# Patient Record
Sex: MALE | Race: WHITE | URBAN - METROPOLITAN AREA
[De-identification: names, ages, dates, MRNs, and addresses within clinical notes are randomized per-mention and may not be internally consistent; named-entity substitution may affect disease eponyms.]

---

## 2017-08-23 ENCOUNTER — OFFICE VISIT (OUTPATIENT)
Dept: FAMILY MEDICINE CLINIC | Age: 47
End: 2017-08-23

## 2017-08-23 VITALS
WEIGHT: 169.2 LBS | BODY MASS INDEX: 24.99 KG/M2 | OXYGEN SATURATION: 100 % | TEMPERATURE: 96.6 F | DIASTOLIC BLOOD PRESSURE: 96 MMHG | SYSTOLIC BLOOD PRESSURE: 162 MMHG | HEART RATE: 67 BPM

## 2017-08-23 DIAGNOSIS — R03.0 ELEVATED BLOOD PRESSURE READING: ICD-10-CM

## 2017-08-23 DIAGNOSIS — F41.9 ANXIETY: ICD-10-CM

## 2017-08-23 DIAGNOSIS — R07.89 CHEST WALL PAIN: Primary | ICD-10-CM

## 2017-08-23 PROCEDURE — 99214 OFFICE O/P EST MOD 30 MIN: CPT | Performed by: FAMILY MEDICINE

## 2017-08-23 RX ORDER — LORAZEPAM 0.5 MG/1
0.5 TABLET ORAL EVERY 8 HOURS PRN
Qty: 90 TABLET | Refills: 2 | Status: SHIPPED | OUTPATIENT
Start: 2017-08-23 | End: 2017-11-20 | Stop reason: SDUPTHER

## 2017-09-14 ENCOUNTER — OFFICE VISIT (OUTPATIENT)
Dept: FAMILY MEDICINE CLINIC | Age: 47
End: 2017-09-14

## 2017-09-14 VITALS
HEART RATE: 96 BPM | DIASTOLIC BLOOD PRESSURE: 92 MMHG | WEIGHT: 173.6 LBS | TEMPERATURE: 97.5 F | SYSTOLIC BLOOD PRESSURE: 155 MMHG | BODY MASS INDEX: 25.64 KG/M2 | RESPIRATION RATE: 16 BRPM

## 2017-09-14 DIAGNOSIS — N40.1 BENIGN NON-NODULAR PROSTATIC HYPERPLASIA WITH LOWER URINARY TRACT SYMPTOMS: ICD-10-CM

## 2017-09-14 DIAGNOSIS — Z00.00 ROUTINE GENERAL MEDICAL EXAMINATION AT A HEALTH CARE FACILITY: ICD-10-CM

## 2017-09-14 DIAGNOSIS — E78.00 HYPERCHOLESTEROLEMIA: ICD-10-CM

## 2017-09-14 DIAGNOSIS — F41.9 ANXIETY: Primary | ICD-10-CM

## 2017-09-14 DIAGNOSIS — R03.0 ELEVATED BLOOD PRESSURE READING: ICD-10-CM

## 2017-09-14 PROCEDURE — 99214 OFFICE O/P EST MOD 30 MIN: CPT | Performed by: FAMILY MEDICINE

## 2017-11-16 DIAGNOSIS — Z00.00 ROUTINE GENERAL MEDICAL EXAMINATION AT A HEALTH CARE FACILITY: ICD-10-CM

## 2017-11-16 DIAGNOSIS — N40.1 BENIGN NON-NODULAR PROSTATIC HYPERPLASIA WITH LOWER URINARY TRACT SYMPTOMS: ICD-10-CM

## 2017-11-16 LAB
A/G RATIO: 2 (ref 1.1–2.2)
ALBUMIN SERPL-MCNC: 4.7 G/DL (ref 3.4–5)
ALP BLD-CCNC: 54 U/L (ref 40–129)
ALT SERPL-CCNC: 25 U/L (ref 10–40)
ANION GAP SERPL CALCULATED.3IONS-SCNC: 15 MMOL/L (ref 3–16)
AST SERPL-CCNC: 24 U/L (ref 15–37)
BILIRUB SERPL-MCNC: 0.4 MG/DL (ref 0–1)
BUN BLDV-MCNC: 21 MG/DL (ref 7–20)
CALCIUM SERPL-MCNC: 9.5 MG/DL (ref 8.3–10.6)
CHLORIDE BLD-SCNC: 98 MMOL/L (ref 99–110)
CHOLESTEROL, TOTAL: 239 MG/DL (ref 0–199)
CO2: 25 MMOL/L (ref 21–32)
CREAT SERPL-MCNC: 0.8 MG/DL (ref 0.9–1.3)
ESTIMATED AVERAGE GLUCOSE: 105.4 MG/DL
GFR AFRICAN AMERICAN: >60
GFR NON-AFRICAN AMERICAN: >60
GLOBULIN: 2.4 G/DL
GLUCOSE BLD-MCNC: 95 MG/DL (ref 70–99)
HBA1C MFR BLD: 5.3 %
HCT VFR BLD CALC: 47.1 % (ref 40.5–52.5)
HDLC SERPL-MCNC: 49 MG/DL (ref 40–60)
HEMOGLOBIN: 15.9 G/DL (ref 13.5–17.5)
LDL CHOLESTEROL CALCULATED: 154 MG/DL
MCH RBC QN AUTO: 31.8 PG (ref 26–34)
MCHC RBC AUTO-ENTMCNC: 33.7 G/DL (ref 31–36)
MCV RBC AUTO: 94.4 FL (ref 80–100)
PDW BLD-RTO: 12.7 % (ref 12.4–15.4)
PLATELET # BLD: 208 K/UL (ref 135–450)
PMV BLD AUTO: 8 FL (ref 5–10.5)
POTASSIUM SERPL-SCNC: 4.6 MMOL/L (ref 3.5–5.1)
PROSTATE SPECIFIC ANTIGEN: 1.78 NG/ML (ref 0–4)
RBC # BLD: 4.99 M/UL (ref 4.2–5.9)
SODIUM BLD-SCNC: 138 MMOL/L (ref 136–145)
T4 FREE: 1.2 NG/DL (ref 0.9–1.8)
TOTAL PROTEIN: 7.1 G/DL (ref 6.4–8.2)
TRIGL SERPL-MCNC: 178 MG/DL (ref 0–150)
TSH SERPL DL<=0.05 MIU/L-ACNC: 1.59 UIU/ML (ref 0.27–4.2)
VLDLC SERPL CALC-MCNC: 36 MG/DL
WBC # BLD: 4.1 K/UL (ref 4–11)

## 2017-11-20 ENCOUNTER — OFFICE VISIT (OUTPATIENT)
Dept: FAMILY MEDICINE CLINIC | Age: 47
End: 2017-11-20

## 2017-11-20 VITALS
HEIGHT: 69 IN | RESPIRATION RATE: 14 BRPM | TEMPERATURE: 97.1 F | HEART RATE: 100 BPM | SYSTOLIC BLOOD PRESSURE: 148 MMHG | WEIGHT: 176.8 LBS | DIASTOLIC BLOOD PRESSURE: 94 MMHG | BODY MASS INDEX: 26.19 KG/M2 | OXYGEN SATURATION: 99 %

## 2017-11-20 DIAGNOSIS — E78.00 HYPERCHOLESTEROLEMIA: ICD-10-CM

## 2017-11-20 DIAGNOSIS — F41.9 ANXIETY: ICD-10-CM

## 2017-11-20 DIAGNOSIS — Z00.00 ROUTINE GENERAL MEDICAL EXAMINATION AT A HEALTH CARE FACILITY: Primary | ICD-10-CM

## 2017-11-20 DIAGNOSIS — I10 ESSENTIAL HYPERTENSION: ICD-10-CM

## 2017-11-20 PROCEDURE — 99396 PREV VISIT EST AGE 40-64: CPT | Performed by: FAMILY MEDICINE

## 2017-11-20 RX ORDER — LISINOPRIL 10 MG/1
10 TABLET ORAL DAILY
Qty: 30 TABLET | Refills: 5 | Status: SHIPPED | OUTPATIENT
Start: 2017-11-20 | End: 2018-05-19 | Stop reason: SDUPTHER

## 2017-11-20 RX ORDER — LORAZEPAM 0.5 MG/1
0.5 TABLET ORAL EVERY 8 HOURS PRN
Qty: 90 TABLET | Refills: 2 | Status: SHIPPED | OUTPATIENT
Start: 2017-11-20 | End: 2018-01-14 | Stop reason: SDUPTHER

## 2017-11-20 ASSESSMENT — PATIENT HEALTH QUESTIONNAIRE - PHQ9
2. FEELING DOWN, DEPRESSED OR HOPELESS: 0
SUM OF ALL RESPONSES TO PHQ QUESTIONS 1-9: 0
1. LITTLE INTEREST OR PLEASURE IN DOING THINGS: 0
SUM OF ALL RESPONSES TO PHQ9 QUESTIONS 1 & 2: 0

## 2017-11-20 NOTE — PROGRESS NOTES
[facility]. The patient has signed appropriate paperworks/consents. Dragon dictation software was used for parts of this progress note. All attempts were made to correct any errors and ensure accuracy.

## 2018-01-04 ENCOUNTER — OFFICE VISIT (OUTPATIENT)
Dept: FAMILY MEDICINE CLINIC | Age: 48
End: 2018-01-04

## 2018-01-04 VITALS
TEMPERATURE: 99.1 F | DIASTOLIC BLOOD PRESSURE: 82 MMHG | WEIGHT: 185.6 LBS | BODY MASS INDEX: 27.41 KG/M2 | HEART RATE: 76 BPM | RESPIRATION RATE: 18 BRPM | SYSTOLIC BLOOD PRESSURE: 124 MMHG | OXYGEN SATURATION: 98 %

## 2018-01-04 DIAGNOSIS — F41.9 ANXIETY: ICD-10-CM

## 2018-01-04 DIAGNOSIS — E78.00 HYPERCHOLESTEROLEMIA: ICD-10-CM

## 2018-01-04 DIAGNOSIS — I10 ESSENTIAL HYPERTENSION: Primary | ICD-10-CM

## 2018-01-04 PROCEDURE — 99214 OFFICE O/P EST MOD 30 MIN: CPT | Performed by: FAMILY MEDICINE

## 2018-01-04 NOTE — PROGRESS NOTES
Here for f/u and recheck of blood pressure. Pt was started on lisinopril for some elevation of blood pressure. Pt has tolerated the medication very well and has seen improvement in blood pressure consistently. Pt checking regularly, and seeing blood pressure readings of 120/70-80's. Pt had dramatic decrease very quickly and has maintained. Pt tolerating w/o any concerning side effects. This /82. Pt does see some intermittent spikes but overall has had some moderate improvement and does feel better. No issues of chest pain, shortness of breath. No dry cough. Pt has had some mild URI sx and some PND, but sx are mild and not taking anything for this. Mood doing great, and feels that mood has actually has been better. Except as noted above in the history of present illness, the review of systems is  negative for headache, vision changes, chest pain, shortness of breath, abdominal pain, urinary sx, bowel changes. Past medical, surgical, and social history reviewed and updated  Medications and allergies reviewed and updated         O: /82   Pulse 76   Temp 99.1 °F (37.3 °C) (Oral)   Resp 18   Wt 185 lb 9.6 oz (84.2 kg)   SpO2 98%   BMI 27.41 kg/m²   GEN: No acute distress, cooperative, well nourished, alert. HEENT: PEERLA, EOMI , normocephalic/atraumatic, nares and oropharynx clear. Mucus membranes normal, Tympanic membranes clear bilaterally. Neck: soft, supple, no thyromegaly, mass, no Lymphadenopathy  CV: Regular rate and rhythm, no murmur, rubs, gallops. No edema. Resp: Clear to auscultation bilaterally good air entry bilaterally  No crackles, wheeze. Breathing comfortably. Ext: no clubbing, cyanosis, edema        ASSESSMENT / PLAN:    1. Essential hypertension  blood pressure doing great with start of ACE therapy  Continue present management. , tolerating well.       2. Hypercholesterolemia  Ok to monitor with diet/exercise  Will continue to monitor and recheck in 6

## 2018-01-14 DIAGNOSIS — F41.9 ANXIETY: ICD-10-CM

## 2018-01-16 RX ORDER — LORAZEPAM 0.5 MG/1
0.5 TABLET ORAL EVERY 8 HOURS PRN
Qty: 90 TABLET | Refills: 0 | Status: SHIPPED | OUTPATIENT
Start: 2018-01-16 | End: 2018-02-15

## 2018-07-12 ENCOUNTER — OFFICE VISIT (OUTPATIENT)
Dept: FAMILY MEDICINE CLINIC | Age: 48
End: 2018-07-12

## 2018-07-12 VITALS
RESPIRATION RATE: 16 BRPM | HEART RATE: 63 BPM | WEIGHT: 167 LBS | DIASTOLIC BLOOD PRESSURE: 84 MMHG | BODY MASS INDEX: 24.66 KG/M2 | OXYGEN SATURATION: 100 % | SYSTOLIC BLOOD PRESSURE: 130 MMHG

## 2018-07-12 DIAGNOSIS — I10 ESSENTIAL HYPERTENSION: Primary | ICD-10-CM

## 2018-07-12 DIAGNOSIS — R63.4 WEIGHT LOSS: ICD-10-CM

## 2018-07-12 DIAGNOSIS — F41.9 ANXIETY: ICD-10-CM

## 2018-07-12 PROCEDURE — 99214 OFFICE O/P EST MOD 30 MIN: CPT | Performed by: FAMILY MEDICINE

## 2018-07-12 RX ORDER — LORAZEPAM 0.5 MG/1
0.5 TABLET ORAL EVERY 8 HOURS PRN
Qty: 90 TABLET | Refills: 2 | Status: SHIPPED | OUTPATIENT
Start: 2018-07-12 | End: 2019-07-22 | Stop reason: SDUPTHER

## 2018-07-12 NOTE — PROGRESS NOTES
every 8 hours as needed for Anxiety for up to 30 days. . 90 tablet 2    lisinopril (PRINIVIL;ZESTRIL) 10 MG tablet TAKE 1 TABLET BY MOUTH DAILY 30 tablet 5    NAPROXEN PO Take  by mouth.  EPINEPHrine (EPIPEN 2-TRAE) 0.3 MG/0.3ML ARIANA injection Inject 0.3 mLs into the muscle once as needed for 1 dose. Use as directed for allergic reaction 2 Device 3     No current facility-administered medications for this visit. Wt Readings from Last 3 Encounters:   07/12/18 167 lb (75.8 kg)   01/04/18 185 lb 9.6 oz (84.2 kg)   11/20/17 176 lb 12.8 oz (80.2 kg)        ASSESSMENT / PLAN:    1. Essential hypertension  Stable @ goal  Cont to monitor with diet/exercise and sodium restriction  Recheck bloodwork at f/u appt/CPE    2. Weight loss  Reviewed weight as above  Pt tends to fluctuate between weights, no sx, exam nofocal, no B sx  Monitor and if continues, will need to consider further eval with bloodwork, imaging, colonoscopy     3. Anxiety  Stable w/o flare  rf given as below, use intermittent  See CSM  - LORazepam (ATIVAN) 0.5 MG tablet; Take 1 tablet by mouth every 8 hours as needed for Anxiety for up to 30 days. .  Dispense: 90 tablet; Refill: 2           Follow-up appointment: For CPE 4-5months    Discussed use, benefit, and side effects of all prescribed medications. Barriers to medication compliance addressed. All patient questions answered. Pt voiced understanding. When applicable, [facility]'s [facility]. The patient has signed appropriate paperworks/consents. Dragon dictation software was used for parts of this progress note. All attempts were made to correct any errors and ensure accuracy.

## 2018-11-29 ENCOUNTER — OFFICE VISIT (OUTPATIENT)
Dept: FAMILY MEDICINE CLINIC | Age: 48
End: 2018-11-29
Payer: COMMERCIAL

## 2018-11-29 VITALS
TEMPERATURE: 98.2 F | BODY MASS INDEX: 24.47 KG/M2 | SYSTOLIC BLOOD PRESSURE: 134 MMHG | DIASTOLIC BLOOD PRESSURE: 96 MMHG | HEART RATE: 82 BPM | RESPIRATION RATE: 12 BRPM | OXYGEN SATURATION: 100 % | WEIGHT: 165.2 LBS | HEIGHT: 69 IN

## 2018-11-29 DIAGNOSIS — F41.9 ANXIETY: ICD-10-CM

## 2018-11-29 DIAGNOSIS — R07.89 RIGHT-SIDED CHEST WALL PAIN: ICD-10-CM

## 2018-11-29 DIAGNOSIS — I10 ESSENTIAL HYPERTENSION: ICD-10-CM

## 2018-11-29 DIAGNOSIS — E78.00 HYPERCHOLESTEROLEMIA: ICD-10-CM

## 2018-11-29 DIAGNOSIS — N40.1 BENIGN PROSTATIC HYPERPLASIA WITH URINARY FREQUENCY: ICD-10-CM

## 2018-11-29 DIAGNOSIS — R35.0 BENIGN PROSTATIC HYPERPLASIA WITH URINARY FREQUENCY: ICD-10-CM

## 2018-11-29 DIAGNOSIS — R53.81 MALAISE: ICD-10-CM

## 2018-11-29 DIAGNOSIS — Z00.00 ROUTINE GENERAL MEDICAL EXAMINATION AT A HEALTH CARE FACILITY: Primary | ICD-10-CM

## 2018-11-29 PROCEDURE — 99396 PREV VISIT EST AGE 40-64: CPT | Performed by: FAMILY MEDICINE

## 2018-11-29 ASSESSMENT — PATIENT HEALTH QUESTIONNAIRE - PHQ9
SUM OF ALL RESPONSES TO PHQ9 QUESTIONS 1 & 2: 2
SUM OF ALL RESPONSES TO PHQ QUESTIONS 1-9: 2
SUM OF ALL RESPONSES TO PHQ QUESTIONS 1-9: 2
1. LITTLE INTEREST OR PLEASURE IN DOING THINGS: 1
2. FEELING DOWN, DEPRESSED OR HOPELESS: 1

## 2018-11-29 NOTE — PROGRESS NOTES
Here for well checkup, physical.  Pt states that overall things have been doing well. Pt here for follow up of blood pressure. Pt states doing great with adherence to therapy and feels well. No issues of chest pain, shortness of breath. No vision changes, headache, swelling in legs. Pt does monitor blood pressure at home intermittently, usually if he feels 'stressed'. They are usually ok but occasionally will see high diastolic numbers in 'P. Pt does find that his medication is doing well for him. Pt does feel that with treatment, it has helped blood pressure and has helped his mood, although sexual drive and motivation is lower. Pt feels that he is trying to eat well, but not as well as before, and not motivated to exercise. Pt is doing very little exercise. No exertional chest pain, shortness of breath. Does not feel overly depressed. Pt had been on lexapro in the past but did not tolerate well. Pt did have some benign prostatic hypertrophy issues, we do track prostate with serial psa and things are stable. Pt did have trial of flomax but did not tolerate. No blood in urine, occasional nocturia. No blood in bowels. No flank pain, no abd pain, bowel changes. Except as noted in the history of present illness as above, the review of systems is negative for the following:    General ROS: negative  Psychological ROS: negative  Allergy and Immunology ROS: negative  Hematological and Lymphatic ROS: negative  Respiratory ROS: no cough, shortness of breath, or wheezing  Cardiovascular ROS: no chest pain or dyspnea on exertion  Gastrointestinal ROS: no abdominal pain, change in bowel habits, or black or bloody stools  Genito-Urinary ROS: no dysuria, trouble voiding, or hematuria  Musculoskeletal ROS: negative  Dermatological ROS: negative      Past medical, surgical, and social history reviewed and updated.    Medications and allergies reviewed and updated            BP (!) 134/106

## 2018-12-05 DIAGNOSIS — R35.0 BENIGN PROSTATIC HYPERPLASIA WITH URINARY FREQUENCY: ICD-10-CM

## 2018-12-05 DIAGNOSIS — Z00.00 ROUTINE GENERAL MEDICAL EXAMINATION AT A HEALTH CARE FACILITY: ICD-10-CM

## 2018-12-05 DIAGNOSIS — N40.1 BENIGN PROSTATIC HYPERPLASIA WITH URINARY FREQUENCY: ICD-10-CM

## 2018-12-05 DIAGNOSIS — R53.81 MALAISE: ICD-10-CM

## 2018-12-05 LAB
A/G RATIO: 1.7 (ref 1.1–2.2)
ALBUMIN SERPL-MCNC: 4.7 G/DL (ref 3.4–5)
ALP BLD-CCNC: 60 U/L (ref 40–129)
ALT SERPL-CCNC: 30 U/L (ref 10–40)
ANION GAP SERPL CALCULATED.3IONS-SCNC: 13 MMOL/L (ref 3–16)
AST SERPL-CCNC: 25 U/L (ref 15–37)
BILIRUB SERPL-MCNC: 0.4 MG/DL (ref 0–1)
BUN BLDV-MCNC: 14 MG/DL (ref 7–20)
CALCIUM SERPL-MCNC: 10 MG/DL (ref 8.3–10.6)
CHLORIDE BLD-SCNC: 102 MMOL/L (ref 99–110)
CHOLESTEROL, TOTAL: 240 MG/DL (ref 0–199)
CO2: 27 MMOL/L (ref 21–32)
CREAT SERPL-MCNC: 0.9 MG/DL (ref 0.9–1.3)
FOLATE: >20 NG/ML (ref 4.78–24.2)
GFR AFRICAN AMERICAN: >60
GFR NON-AFRICAN AMERICAN: >60
GLOBULIN: 2.8 G/DL
GLUCOSE BLD-MCNC: 100 MG/DL (ref 70–99)
HCT VFR BLD CALC: 45.4 % (ref 40.5–52.5)
HDLC SERPL-MCNC: 54 MG/DL (ref 40–60)
HEMOGLOBIN: 15.4 G/DL (ref 13.5–17.5)
LDL CHOLESTEROL CALCULATED: 159 MG/DL
MCH RBC QN AUTO: 32 PG (ref 26–34)
MCHC RBC AUTO-ENTMCNC: 33.9 G/DL (ref 31–36)
MCV RBC AUTO: 94.6 FL (ref 80–100)
PDW BLD-RTO: 12.5 % (ref 12.4–15.4)
PLATELET # BLD: 225 K/UL (ref 135–450)
PMV BLD AUTO: 7.8 FL (ref 5–10.5)
POTASSIUM SERPL-SCNC: 4.6 MMOL/L (ref 3.5–5.1)
PROSTATE SPECIFIC ANTIGEN: 2.21 NG/ML (ref 0–4)
RBC # BLD: 4.81 M/UL (ref 4.2–5.9)
SODIUM BLD-SCNC: 142 MMOL/L (ref 136–145)
T4 FREE: 1.1 NG/DL (ref 0.9–1.8)
TOTAL PROTEIN: 7.5 G/DL (ref 6.4–8.2)
TRIGL SERPL-MCNC: 137 MG/DL (ref 0–150)
TSH SERPL DL<=0.05 MIU/L-ACNC: 0.97 UIU/ML (ref 0.27–4.2)
VITAMIN B-12: 506 PG/ML (ref 211–911)
VLDLC SERPL CALC-MCNC: 27 MG/DL
WBC # BLD: 7.1 K/UL (ref 4–11)

## 2018-12-06 LAB
ESTIMATED AVERAGE GLUCOSE: 119.8 MG/DL
HBA1C MFR BLD: 5.8 %

## 2018-12-07 LAB — TESTOSTERONE TOTAL: 567 NG/DL (ref 220–1000)

## 2019-02-18 ENCOUNTER — OFFICE VISIT (OUTPATIENT)
Dept: FAMILY MEDICINE CLINIC | Age: 49
End: 2019-02-18
Payer: COMMERCIAL

## 2019-02-18 VITALS
SYSTOLIC BLOOD PRESSURE: 134 MMHG | WEIGHT: 175.6 LBS | HEART RATE: 79 BPM | OXYGEN SATURATION: 100 % | DIASTOLIC BLOOD PRESSURE: 90 MMHG | TEMPERATURE: 97.7 F | BODY MASS INDEX: 26.31 KG/M2

## 2019-02-18 DIAGNOSIS — I10 ESSENTIAL HYPERTENSION: ICD-10-CM

## 2019-02-18 DIAGNOSIS — J01.00 ACUTE MAXILLARY SINUSITIS, RECURRENCE NOT SPECIFIED: Primary | ICD-10-CM

## 2019-02-18 PROCEDURE — 99213 OFFICE O/P EST LOW 20 MIN: CPT | Performed by: FAMILY MEDICINE

## 2019-02-18 RX ORDER — AMOXICILLIN AND CLAVULANATE POTASSIUM 875; 125 MG/1; MG/1
1 TABLET, FILM COATED ORAL 2 TIMES DAILY
Qty: 20 TABLET | Refills: 0 | Status: SHIPPED | OUTPATIENT
Start: 2019-02-18 | End: 2019-02-28

## 2019-07-22 ENCOUNTER — OFFICE VISIT (OUTPATIENT)
Dept: FAMILY MEDICINE CLINIC | Age: 49
End: 2019-07-22
Payer: COMMERCIAL

## 2019-07-22 VITALS
TEMPERATURE: 97.2 F | DIASTOLIC BLOOD PRESSURE: 84 MMHG | OXYGEN SATURATION: 100 % | WEIGHT: 169.6 LBS | HEART RATE: 109 BPM | BODY MASS INDEX: 25.41 KG/M2 | RESPIRATION RATE: 12 BRPM | SYSTOLIC BLOOD PRESSURE: 128 MMHG

## 2019-07-22 DIAGNOSIS — F41.9 ANXIETY: ICD-10-CM

## 2019-07-22 DIAGNOSIS — I10 ESSENTIAL HYPERTENSION: Primary | ICD-10-CM

## 2019-07-22 DIAGNOSIS — E78.00 HYPERCHOLESTEROLEMIA: ICD-10-CM

## 2019-07-22 DIAGNOSIS — J30.2 SEASONAL ALLERGIC RHINITIS, UNSPECIFIED TRIGGER: ICD-10-CM

## 2019-07-22 DIAGNOSIS — R73.9 HYPERGLYCEMIA: ICD-10-CM

## 2019-07-22 PROCEDURE — 99214 OFFICE O/P EST MOD 30 MIN: CPT | Performed by: FAMILY MEDICINE

## 2019-07-22 RX ORDER — LORAZEPAM 0.5 MG/1
0.5 TABLET ORAL EVERY 8 HOURS PRN
Qty: 90 TABLET | Refills: 2 | Status: SHIPPED | OUTPATIENT
Start: 2019-07-22 | End: 2019-08-21

## 2019-07-22 ASSESSMENT — PATIENT HEALTH QUESTIONNAIRE - PHQ9
1. LITTLE INTEREST OR PLEASURE IN DOING THINGS: 0
SUM OF ALL RESPONSES TO PHQ QUESTIONS 1-9: 0
SUM OF ALL RESPONSES TO PHQ9 QUESTIONS 1 & 2: 0
2. FEELING DOWN, DEPRESSED OR HOPELESS: 0
SUM OF ALL RESPONSES TO PHQ QUESTIONS 1-9: 0

## 2019-12-03 ENCOUNTER — OFFICE VISIT (OUTPATIENT)
Dept: FAMILY MEDICINE CLINIC | Age: 49
End: 2019-12-03
Payer: COMMERCIAL

## 2019-12-03 VITALS
HEIGHT: 69 IN | SYSTOLIC BLOOD PRESSURE: 134 MMHG | OXYGEN SATURATION: 97 % | TEMPERATURE: 98.1 F | DIASTOLIC BLOOD PRESSURE: 82 MMHG | HEART RATE: 94 BPM | WEIGHT: 177.2 LBS | RESPIRATION RATE: 16 BRPM | BODY MASS INDEX: 26.25 KG/M2

## 2019-12-03 DIAGNOSIS — R10.84 GENERALIZED ABDOMINAL PAIN: ICD-10-CM

## 2019-12-03 DIAGNOSIS — N40.1 BENIGN PROSTATIC HYPERPLASIA WITH URINARY FREQUENCY: ICD-10-CM

## 2019-12-03 DIAGNOSIS — I10 ESSENTIAL HYPERTENSION: ICD-10-CM

## 2019-12-03 DIAGNOSIS — Z00.00 ROUTINE GENERAL MEDICAL EXAMINATION AT A HEALTH CARE FACILITY: Primary | ICD-10-CM

## 2019-12-03 DIAGNOSIS — R35.0 BENIGN PROSTATIC HYPERPLASIA WITH URINARY FREQUENCY: ICD-10-CM

## 2019-12-03 DIAGNOSIS — R73.9 HYPERGLYCEMIA: ICD-10-CM

## 2019-12-03 DIAGNOSIS — N41.0 ACUTE PROSTATITIS: ICD-10-CM

## 2019-12-03 DIAGNOSIS — E78.00 HYPERCHOLESTEROLEMIA: ICD-10-CM

## 2019-12-03 PROCEDURE — 99396 PREV VISIT EST AGE 40-64: CPT | Performed by: FAMILY MEDICINE

## 2019-12-03 RX ORDER — LISINOPRIL 20 MG/1
20 TABLET ORAL DAILY
Qty: 90 TABLET | Refills: 3 | Status: SHIPPED | OUTPATIENT
Start: 2019-12-03 | End: 2020-12-28

## 2019-12-03 RX ORDER — SULFAMETHOXAZOLE AND TRIMETHOPRIM 800; 160 MG/1; MG/1
1 TABLET ORAL 2 TIMES DAILY
Qty: 20 TABLET | Refills: 0 | Status: SHIPPED | OUTPATIENT
Start: 2019-12-03 | End: 2019-12-13

## 2019-12-06 DIAGNOSIS — N40.1 BENIGN PROSTATIC HYPERPLASIA WITH URINARY FREQUENCY: ICD-10-CM

## 2019-12-06 DIAGNOSIS — R10.84 GENERALIZED ABDOMINAL PAIN: ICD-10-CM

## 2019-12-06 DIAGNOSIS — Z00.00 ROUTINE GENERAL MEDICAL EXAMINATION AT A HEALTH CARE FACILITY: ICD-10-CM

## 2019-12-06 DIAGNOSIS — R35.0 BENIGN PROSTATIC HYPERPLASIA WITH URINARY FREQUENCY: ICD-10-CM

## 2019-12-06 LAB
A/G RATIO: 1.7 (ref 1.1–2.2)
ALBUMIN SERPL-MCNC: 4.7 G/DL (ref 3.4–5)
ALP BLD-CCNC: 57 U/L (ref 40–129)
ALT SERPL-CCNC: 37 U/L (ref 10–40)
AMYLASE: 113 U/L (ref 25–115)
ANION GAP SERPL CALCULATED.3IONS-SCNC: 13 MMOL/L (ref 3–16)
AST SERPL-CCNC: 33 U/L (ref 15–37)
BILIRUB SERPL-MCNC: 0.7 MG/DL (ref 0–1)
BILIRUBIN URINE: NEGATIVE
BLOOD, URINE: NEGATIVE
BUN BLDV-MCNC: 19 MG/DL (ref 7–20)
CALCIUM SERPL-MCNC: 10.2 MG/DL (ref 8.3–10.6)
CHLORIDE BLD-SCNC: 96 MMOL/L (ref 99–110)
CHOLESTEROL, TOTAL: 278 MG/DL (ref 0–199)
CLARITY: CLEAR
CO2: 24 MMOL/L (ref 21–32)
COLOR: YELLOW
CREAT SERPL-MCNC: 1.1 MG/DL (ref 0.9–1.3)
GFR AFRICAN AMERICAN: >60
GFR NON-AFRICAN AMERICAN: >60
GLOBULIN: 2.7 G/DL
GLUCOSE BLD-MCNC: 96 MG/DL (ref 70–99)
GLUCOSE URINE: NEGATIVE MG/DL
HCT VFR BLD CALC: 45 % (ref 40.5–52.5)
HDLC SERPL-MCNC: 45 MG/DL (ref 40–60)
HEMOGLOBIN: 15.6 G/DL (ref 13.5–17.5)
KETONES, URINE: NEGATIVE MG/DL
LDL CHOLESTEROL CALCULATED: 193 MG/DL
LEUKOCYTE ESTERASE, URINE: NEGATIVE
LIPASE: 41 U/L (ref 13–60)
MCH RBC QN AUTO: 32.1 PG (ref 26–34)
MCHC RBC AUTO-ENTMCNC: 34.6 G/DL (ref 31–36)
MCV RBC AUTO: 92.9 FL (ref 80–100)
MICROSCOPIC EXAMINATION: NORMAL
NITRITE, URINE: NEGATIVE
PDW BLD-RTO: 12.2 % (ref 12.4–15.4)
PH UA: 6 (ref 5–8)
PLATELET # BLD: 196 K/UL (ref 135–450)
PMV BLD AUTO: 7.9 FL (ref 5–10.5)
POTASSIUM SERPL-SCNC: 4.6 MMOL/L (ref 3.5–5.1)
PROSTATE SPECIFIC ANTIGEN: 2.29 NG/ML (ref 0–4)
PROTEIN UA: NEGATIVE MG/DL
RBC # BLD: 4.84 M/UL (ref 4.2–5.9)
SODIUM BLD-SCNC: 133 MMOL/L (ref 136–145)
SPECIFIC GRAVITY UA: 1.02 (ref 1–1.03)
TOTAL PROTEIN: 7.4 G/DL (ref 6.4–8.2)
TRIGL SERPL-MCNC: 202 MG/DL (ref 0–150)
URINE TYPE: NORMAL
UROBILINOGEN, URINE: 0.2 E.U./DL
VLDLC SERPL CALC-MCNC: 40 MG/DL
WBC # BLD: 4.5 K/UL (ref 4–11)

## 2019-12-07 LAB
ESTIMATED AVERAGE GLUCOSE: 111.2 MG/DL
HBA1C MFR BLD: 5.5 %

## 2019-12-11 DIAGNOSIS — F41.9 ANXIETY: Primary | ICD-10-CM

## 2019-12-12 RX ORDER — LISINOPRIL 10 MG/1
10 TABLET ORAL DAILY
Qty: 30 TABLET | Refills: 5 | Status: SHIPPED | OUTPATIENT
Start: 2019-12-12 | End: 2020-11-17

## 2019-12-12 RX ORDER — LORAZEPAM 0.5 MG/1
TABLET ORAL
Qty: 90 TABLET | Refills: 0 | Status: SHIPPED | OUTPATIENT
Start: 2019-12-12 | End: 2020-10-09

## 2020-01-06 ENCOUNTER — TELEPHONE (OUTPATIENT)
Dept: FAMILY MEDICINE CLINIC | Age: 50
End: 2020-01-06

## 2020-01-14 ENCOUNTER — TELEPHONE (OUTPATIENT)
Dept: FAMILY MEDICINE CLINIC | Age: 50
End: 2020-01-14

## 2020-10-09 ENCOUNTER — OFFICE VISIT (OUTPATIENT)
Dept: FAMILY MEDICINE CLINIC | Age: 50
End: 2020-10-09
Payer: COMMERCIAL

## 2020-10-09 VITALS
DIASTOLIC BLOOD PRESSURE: 87 MMHG | HEIGHT: 70 IN | OXYGEN SATURATION: 98 % | BODY MASS INDEX: 25.77 KG/M2 | WEIGHT: 180 LBS | RESPIRATION RATE: 12 BRPM | TEMPERATURE: 97.8 F | HEART RATE: 84 BPM | SYSTOLIC BLOOD PRESSURE: 137 MMHG

## 2020-10-09 DIAGNOSIS — Z00.00 WELL ADULT EXAM: ICD-10-CM

## 2020-10-09 DIAGNOSIS — R10.10 UPPER ABDOMINAL PAIN: ICD-10-CM

## 2020-10-09 DIAGNOSIS — R10.13 DYSPEPSIA: ICD-10-CM

## 2020-10-09 LAB
A/G RATIO: 1.7 (ref 1.1–2.2)
ALBUMIN SERPL-MCNC: 4.5 G/DL (ref 3.4–5)
ALP BLD-CCNC: 58 U/L (ref 40–129)
ALT SERPL-CCNC: 41 U/L (ref 10–40)
AMYLASE: 123 U/L (ref 25–115)
ANION GAP SERPL CALCULATED.3IONS-SCNC: 11 MMOL/L (ref 3–16)
AST SERPL-CCNC: 30 U/L (ref 15–37)
BASOPHILS ABSOLUTE: 0 K/UL (ref 0–0.2)
BASOPHILS RELATIVE PERCENT: 0.7 %
BILIRUB SERPL-MCNC: 0.4 MG/DL (ref 0–1)
BUN BLDV-MCNC: 15 MG/DL (ref 7–20)
CALCIUM SERPL-MCNC: 10.1 MG/DL (ref 8.3–10.6)
CHLORIDE BLD-SCNC: 101 MMOL/L (ref 99–110)
CHOLESTEROL, TOTAL: 250 MG/DL (ref 0–199)
CO2: 26 MMOL/L (ref 21–32)
CREAT SERPL-MCNC: 0.9 MG/DL (ref 0.9–1.3)
EOSINOPHILS ABSOLUTE: 0 K/UL (ref 0–0.6)
EOSINOPHILS RELATIVE PERCENT: 0.9 %
GFR AFRICAN AMERICAN: >60
GFR NON-AFRICAN AMERICAN: >60
GLOBULIN: 2.6 G/DL
GLUCOSE BLD-MCNC: 86 MG/DL (ref 70–99)
HCT VFR BLD CALC: 46.2 % (ref 40.5–52.5)
HDLC SERPL-MCNC: 49 MG/DL (ref 40–60)
HEMOGLOBIN: 15.6 G/DL (ref 13.5–17.5)
LDL CHOLESTEROL CALCULATED: 155 MG/DL
LIPASE: 38 U/L (ref 13–60)
LYMPHOCYTES ABSOLUTE: 0.8 K/UL (ref 1–5.1)
LYMPHOCYTES RELATIVE PERCENT: 15.7 %
MCH RBC QN AUTO: 32.3 PG (ref 26–34)
MCHC RBC AUTO-ENTMCNC: 33.8 G/DL (ref 31–36)
MCV RBC AUTO: 95.5 FL (ref 80–100)
MONOCYTES ABSOLUTE: 0.5 K/UL (ref 0–1.3)
MONOCYTES RELATIVE PERCENT: 10.9 %
NEUTROPHILS ABSOLUTE: 3.5 K/UL (ref 1.7–7.7)
NEUTROPHILS RELATIVE PERCENT: 71.8 %
PDW BLD-RTO: 12.6 % (ref 12.4–15.4)
PLATELET # BLD: 232 K/UL (ref 135–450)
PMV BLD AUTO: 8 FL (ref 5–10.5)
POTASSIUM SERPL-SCNC: 4.3 MMOL/L (ref 3.5–5.1)
PROSTATE SPECIFIC ANTIGEN: 2.65 NG/ML (ref 0–4)
RBC # BLD: 4.83 M/UL (ref 4.2–5.9)
SODIUM BLD-SCNC: 138 MMOL/L (ref 136–145)
TOTAL PROTEIN: 7.1 G/DL (ref 6.4–8.2)
TRIGL SERPL-MCNC: 232 MG/DL (ref 0–150)
VLDLC SERPL CALC-MCNC: 46 MG/DL
WBC # BLD: 4.9 K/UL (ref 4–11)

## 2020-10-09 PROCEDURE — 1036F TOBACCO NON-USER: CPT | Performed by: FAMILY MEDICINE

## 2020-10-09 PROCEDURE — G8484 FLU IMMUNIZE NO ADMIN: HCPCS | Performed by: FAMILY MEDICINE

## 2020-10-09 PROCEDURE — G8419 CALC BMI OUT NRM PARAM NOF/U: HCPCS | Performed by: FAMILY MEDICINE

## 2020-10-09 PROCEDURE — 90686 IIV4 VACC NO PRSV 0.5 ML IM: CPT | Performed by: FAMILY MEDICINE

## 2020-10-09 PROCEDURE — 90471 IMMUNIZATION ADMIN: CPT | Performed by: FAMILY MEDICINE

## 2020-10-09 PROCEDURE — 99214 OFFICE O/P EST MOD 30 MIN: CPT | Performed by: FAMILY MEDICINE

## 2020-10-09 PROCEDURE — G8427 DOCREV CUR MEDS BY ELIG CLIN: HCPCS | Performed by: FAMILY MEDICINE

## 2020-10-09 RX ORDER — PANTOPRAZOLE SODIUM 40 MG/1
40 TABLET, DELAYED RELEASE ORAL DAILY
Qty: 30 TABLET | Refills: 1 | Status: SHIPPED | OUTPATIENT
Start: 2020-10-09 | End: 2020-11-17 | Stop reason: ALTCHOICE

## 2020-10-09 RX ORDER — LORAZEPAM 0.5 MG/1
0.5 TABLET ORAL EVERY 8 HOURS PRN
Qty: 30 TABLET | Refills: 0 | Status: SHIPPED | OUTPATIENT
Start: 2020-10-09 | End: 2020-11-08

## 2020-10-09 RX ORDER — LORAZEPAM 0.5 MG/1
TABLET ORAL
Qty: 90 TABLET | Refills: 0 | Status: CANCELLED | OUTPATIENT
Start: 2020-10-09 | End: 2021-01-09

## 2020-10-09 ASSESSMENT — PATIENT HEALTH QUESTIONNAIRE - PHQ9
SUM OF ALL RESPONSES TO PHQ9 QUESTIONS 1 & 2: 0
SUM OF ALL RESPONSES TO PHQ QUESTIONS 1-9: 0
1. LITTLE INTEREST OR PLEASURE IN DOING THINGS: 0
2. FEELING DOWN, DEPRESSED OR HOPELESS: 0
SUM OF ALL RESPONSES TO PHQ QUESTIONS 1-9: 0

## 2020-10-09 NOTE — PROGRESS NOTES
Tobacco Use    Smoking status: Never Smoker    Smokeless tobacco: Never Used   Substance Use Topics    Alcohol use: Yes     Comment: social     Drug use: Never            Review of Systems  Review of Systems      Lab Results   Component Value Date     (L) 12/06/2019    K 4.6 12/06/2019    CL 96 (L) 12/06/2019    CO2 24 12/06/2019    BUN 19 12/06/2019    CREATININE 1.1 12/06/2019    GLUCOSE 96 12/06/2019    CALCIUM 10.2 12/06/2019    PROT 7.4 12/06/2019    LABALBU 4.7 12/06/2019    BILITOT 0.7 12/06/2019    ALKPHOS 57 12/06/2019    AST 33 12/06/2019    ALT 37 12/06/2019    LABGLOM >60 12/06/2019    GFRAA >60 12/06/2019    AGRATIO 1.7 12/06/2019    GLOB 2.7 12/06/2019        . Lab Results   Component Value Date    WBC 4.5 12/06/2019    HGB 15.6 12/06/2019    HCT 45.0 12/06/2019    MCV 92.9 12/06/2019     12/06/2019      Objective:   Physical Exam  Vitals:    10/09/20 1049   BP: 137/87   Pulse: 84   Resp: 12   Temp: 97.8 °F (36.6 °C)   TempSrc: Temporal   SpO2: 98%   Weight: 180 lb (81.6 kg)   Height: 5' 9.5\" (1.765 m)       Physical Exam  NAD    Skin is warm and dry. No rash. Well hydrated Mood and affect are normal.   Chest: clear with no wheezes or rales. No retractions, or use of accessory muscles noted. Cardiovascular: PMI is not displaced, and no thrill noted. Regular rate and rhythm with no rub, murmur or gallop. No peripheral edema. The abdomen is soft with epigastric tenderness; no  guarding, mass, rebound or organomegaly. Aorta, femoral, DP and PT pulses intact. Assessment:       Diagnosis Orders   1. Dyspepsia  Comprehensive Metabolic Panel    Amylase    Lipase    H. Pylori Breath Test, Adult    CBC Auto Differential    pantoprazole (PROTONIX) 40 MG tablet  Likely PUD due to NSAID>    Rule out h hyplor. Stop NSAID. To ER if melanotic stool or hematemesis, worsening   Follow up if not resolved in 7 days or if recurrent sxs    2.  Anxiety  LORazepam (ATIVAN) 0.5 MG tablet   3. Upper abdominal pain  Comprehensive Metabolic Panel    Amylase    Lipase    H. Pylori Breath Test, Adult    CBC Auto Differential    pantoprazole (PROTONIX) 40 MG tablet   4. Well adult exam  PSA, Prostatic Specific Antigen    Lipid Panel            Side effects of current medications reviewed and questions answered. Call or return to clinic prn if these symptoms worsen or fail to improve as anticipated.    Plan:

## 2020-10-09 NOTE — PROGRESS NOTES
Vaccine Information Sheet, \"Influenza - Inactivated\"  given to Cristhian Taylor, or parent/legal guardian of  Cristhian Taylor and verbalized understanding. Patient responses:    Have you ever had a reaction to a flu vaccine? No  Do you have any current illness? No  Have you ever had Guillian [place] Syndrome? No  Do you have a serious allergy to any of the follow: Neomycin, Polymyxin, Thimerosal, eggs or egg products? No    Flu vaccine given per order. Please see immunization tab. Risks and benefits explained. Current VIS given.

## 2020-10-11 LAB — H PYLORI BREATH TEST: NEGATIVE

## 2020-11-17 ENCOUNTER — OFFICE VISIT (OUTPATIENT)
Dept: FAMILY MEDICINE CLINIC | Age: 50
End: 2020-11-17
Payer: COMMERCIAL

## 2020-11-17 ENCOUNTER — PATIENT MESSAGE (OUTPATIENT)
Dept: FAMILY MEDICINE CLINIC | Age: 50
End: 2020-11-17

## 2020-11-17 VITALS
WEIGHT: 169.6 LBS | HEART RATE: 92 BPM | BODY MASS INDEX: 24.28 KG/M2 | TEMPERATURE: 95.9 F | DIASTOLIC BLOOD PRESSURE: 80 MMHG | HEIGHT: 70 IN | SYSTOLIC BLOOD PRESSURE: 126 MMHG | OXYGEN SATURATION: 99 %

## 2020-11-17 PROCEDURE — 99396 PREV VISIT EST AGE 40-64: CPT | Performed by: FAMILY MEDICINE

## 2020-11-17 PROCEDURE — G8482 FLU IMMUNIZE ORDER/ADMIN: HCPCS | Performed by: FAMILY MEDICINE

## 2020-11-17 RX ORDER — QUETIAPINE FUMARATE 25 MG/1
25-50 TABLET, FILM COATED ORAL NIGHTLY
Qty: 30 TABLET | Refills: 5 | Status: SHIPPED | OUTPATIENT
Start: 2020-11-17 | End: 2021-06-04 | Stop reason: SDUPTHER

## 2020-11-17 RX ORDER — LORAZEPAM 0.5 MG/1
TABLET ORAL
Qty: 90 TABLET | Refills: 0 | Status: SHIPPED | OUTPATIENT
Start: 2020-11-17 | End: 2021-12-17 | Stop reason: SDUPTHER

## 2020-11-17 NOTE — PROGRESS NOTES
Here for well checkup, physical.  Pt states that this year has been a tough year, not just related to Cheryl but in general.  Pt that besides COVID, there has been a lot going on, including his brother moving in to their house while they fix up their new house. Pt was seen in office abut 6 weeks ago due to some dyspepsia and was dx with likely PUD/ulcer and related to use of NSAIDs. Pt was treated with protonix and sx have improved significantly. Pt has noted that he is drinking a little bit more and does continue to use the lorazepam, with last refills when he saw dr. Michelet Peterson. Pt uses on average a few times a week, more frequently recently d/t stressors. Pt tends to use one prescription per year on average. Pt normally takes for sleep only and does great for him. Pt usually goes to bed on his own, but wakes up with anxiety. Takes at that time and does better. Pt was on lexapro in the past and feels may have helped but had side effects. Not interested in taking long-term therapy if he can avoid. Pt has not done much in terms of counseling d/t concerns for cost.      Here for f/u of cholesterol. Pt as been working on diet/exercise and is consistent with therapy. No side effects from current therapy. No chest pain, shortness of breath. No numbness/tingling in extremities. Pt is not doing any formal exercise but is active.   Diet has been better, does monitor          Except as noted in the history of present illness as above, the review of systems is negative for the following:    General ROS: negative  Psychological ROS: negative  Allergy and Immunology ROS: negative  Hematological and Lymphatic ROS: negative  Respiratory ROS: no cough, shortness of breath, or wheezing  Cardiovascular ROS: no chest pain or dyspnea on exertion  Gastrointestinal ROS: no abdominal pain, change in bowel habits, or black or bloody stools  Genito-Urinary ROS: no dysuria, trouble voiding, or hematuria  Musculoskeletal ROS: negative  Dermatological ROS: negative      Past medical, surgical, and social history reviewed and updated. Medications and allergies reviewed and updated      /80 (Site: Right Upper Arm, Position: Sitting, Cuff Size: Medium Adult)   Pulse 92   Temp 95.9 °F (35.5 °C) (Temporal)   Ht 5' 9.5\" (1.765 m)   Wt 169 lb 9.6 oz (76.9 kg)   SpO2 99%   BMI 24.69 kg/m²   General appearance - healthy, alert, no distress  Skin - Skin color, texture, turgor normal. No rashes or lesions. No suspicious findings  Head - Normocephalic. No masses, lesions, tenderness or abnormalities  Eyes - conjunctivae/corneas clear. Pupils equal and reactive to light and accomodation, extraocular muscles intact. Ears - External ears normal. Canals clear. Tympanic membranes normal bilaterally. Nose/Sinuses - Nares normal. Septum midline. Mucosa normal. No drainage or sinus tenderness. Oropharynx - Lips, mucosa, and tongue normal. Teeth and gums normal.   Neck - Neck supple. No adenopathy. Thyroid symmetric, normal size, no carotid bruit bilaterally  Back - Back symmetric, no curvature. Range of motion normal. No Costovertebral angle tenderness. Lungs - Percussion normal. Good diaphragmatic excursion. Lungs clear without wheeze, rales, crackles  Heart - Regular rate and rhythm, with no rub, murmur or gallop noted. Abdomen - Abdomen soft, non-tender. Bowel sounds normal. No masses, tenderness or organomegaly  Extremities - Extremities normal. No deformities, edema, or skin discoloration  Musculoskeletal - Spine ROM normal. Muscular strength intact. Peripheral pulses - radial=4/4,, femoral=4/4, popliteal=4/4, dorsalis pedis=4/4,  Neuro - Gait normal. Reflexes normal and symmetric. Sensation grossly normal.  No focal weakness  Psych - euthymic, no suicidal thoughts or ideation, mood stable.          The 10-year ASCVD risk score (Hugo Anaya, et al., 2013) is: 5%    Values used to calculate the score:      Age: 53 years      Sex: Male      Is Non- : No      Diabetic: No      Tobacco smoker: No      Systolic Blood Pressure: 387 mmHg      Is BP treated: Yes      HDL Cholesterol: 49 mg/dL      Total Cholesterol: 250 mg/dL         Current Outpatient Medications   Medication Sig Dispense Refill    LORazepam (ATIVAN) 0.5 MG tablet TAKE 1 TABLET BY MOUTH EVERY 8 HOURS AS NEEDED FOR ANXIETY 90 tablet 0    QUEtiapine (SEROQUEL) 25 MG tablet Take 1-2 tablets by mouth nightly 30 tablet 5    lisinopril (PRINIVIL;ZESTRIL) 20 MG tablet Take 1 tablet by mouth daily 90 tablet 3     No current facility-administered medications for this visit. ASSESSMENT / PLAN:    1. Routine general medical examination at a health care facility  No focal abnormalities on exam  Anticipatory guidance discussed. Reviewed recent bloodwork     2. Anxiety  Doing ok with some mild breakthru sx  Not interseted in SSRI therapy d/t concern of side effects  Discussed tx options. Cont prn lorazepam qhs, refills given as below  See CSM  Monitor with trial seroquel as below  F/u 1 eboni  - LORazepam (ATIVAN) 0.5 MG tablet; TAKE 1 TABLET BY MOUTH EVERY 8 HOURS AS NEEDED FOR ANXIETY  Dispense: 90 tablet; Refill: 0    3. Hypercholesterolemia  Borderline increase, improved from prior  ASCVD risk 5%  Check Ct calcium score  - CT CARDIAC CALCIUM SCORING; Future    4. Essential hypertension  blood pressure stable @ goal    5. Benign prostatic hyperplasia with urinary frequency  Stable w/o sx  Monitor serial psa    6. Psychophysiological insomnia  Add trial seroquel 25-50mg qhs   Discussed use, benefit, and side effects of prescribed medications. Barriers to medication compliance addressed. All patient questions answered. Pt voiced understanding. 7. Screening, ischemic heart disease  - CT CARDIAC CALCIUM SCORING; Future           Follow-up appointment:   Pending CT scan, 1 month    Discussed use, benefit, and side effects of all prescribed medications. Barriers to medication compliance addressed. All patient questions answered. Pt voiced understanding. When applicable, patient's outside records were reviewed through [facility]. The patient has signed appropriate paperworks/consents.

## 2020-11-18 NOTE — TELEPHONE ENCOUNTER
The pharmacy did give the patient clonazepam instead of the lorazepam.     The pharmacy is calling the patient and a Qoof message was sent as well.

## 2020-12-22 ENCOUNTER — OFFICE VISIT (OUTPATIENT)
Dept: FAMILY MEDICINE CLINIC | Age: 50
End: 2020-12-22
Payer: COMMERCIAL

## 2020-12-22 PROCEDURE — G8427 DOCREV CUR MEDS BY ELIG CLIN: HCPCS | Performed by: FAMILY MEDICINE

## 2020-12-22 PROCEDURE — G8419 CALC BMI OUT NRM PARAM NOF/U: HCPCS | Performed by: FAMILY MEDICINE

## 2020-12-22 PROCEDURE — G8482 FLU IMMUNIZE ORDER/ADMIN: HCPCS | Performed by: FAMILY MEDICINE

## 2020-12-22 PROCEDURE — 1036F TOBACCO NON-USER: CPT | Performed by: FAMILY MEDICINE

## 2020-12-22 PROCEDURE — 99214 OFFICE O/P EST MOD 30 MIN: CPT | Performed by: FAMILY MEDICINE

## 2020-12-22 RX ORDER — PANTOPRAZOLE SODIUM 40 MG/1
40 TABLET, DELAYED RELEASE ORAL DAILY
Qty: 30 TABLET | Refills: 5 | Status: SHIPPED | OUTPATIENT
Start: 2020-12-22 | End: 2022-07-26 | Stop reason: SDUPTHER

## 2020-12-22 NOTE — PROGRESS NOTES
Here for f/u and recheck of mood, sleep. Pt states that since last visit, did have CT scan of heart and did get started on seroquel to help with sleep, mood. Pt has found that it has been very helpful to help his sleep but felt that it was very powerful and did decrease dose to 1/2 tab due to some moderate sedation. Pt taking about 1 /hr prior to sleep and does help him to fall and stay asleep  Some mild grogginess in the AM.  Pt has noted a mild improvement in mood stabilization, and even at low doses does see some improvement. Anxiety is lower overall and feels that he is overall happy with the benefit. Pt did initially take 1 tab qhs but recently cut down to 1/2 tab qhs. Pt has noted that if he drinks alcohol, will not take. Pt has not needed the lorazepam as much due to benefit of seroquel. Here for f/u of cholesterol. Pt as been working on diet/exercise and is consistent with therapy. No side effects from current therapy. No chest pain, shortness of breath. No numbness/tingling in extremities     Pt here for follow up of blood pressure. Pt states doing great with adherence to therapy and feels well. No issues of chest pain, shortness of breath. No vision changes, headache, swelling in legs. Pt does miss blood pressure on occasion and does find that when he takes regularly, his blood pressure is doing. Except as noted above in the history of present illness, the review of systems is  negative for headache, vision changes, chest pain, shortness of breath, abdominal pain, urinary sx, bowel changes. Past medical, surgical, and social history reviewed and updated  Medications and allergies reviewed and updated       O: BP (!) 142/82   Pulse 110   Temp 97.9 °F (36.6 °C) (Temporal)   Resp 20   Wt 183 lb 3.2 oz (83.1 kg)   SpO2 99%   BMI 26.67 kg/m²   GEN: No acute distress, cooperative, well nourished, alert.    HEENT: PEERLA, EOMI , normocephalic/atraumatic, nares and oropharynx clear.  Mucous membranes normal, Tympanic membranes clear bilaterally. Neck: soft, supple, no thyromegaly, mass, no Lymphadenopathy  CV: Regular rate and rhythm, no murmur, rubs, gallops. No edema. Resp: Clear to auscultation bilaterally good air entry bilaterally  No crackles, wheeze. Breathing comfortably. Psych: mood stable, No suicidal thoughts or ideation     Current Outpatient Medications   Medication Sig Dispense Refill    pantoprazole (PROTONIX) 40 MG tablet Take 1 tablet by mouth daily 30 tablet 5    LORazepam (ATIVAN) 0.5 MG tablet TAKE 1 TABLET BY MOUTH EVERY 8 HOURS AS NEEDED FOR ANXIETY 90 tablet 0    QUEtiapine (SEROQUEL) 25 MG tablet Take 1-2 tablets by mouth nightly 30 tablet 5    lisinopril (PRINIVIL;ZESTRIL) 20 MG tablet Take 1 tablet by mouth daily 90 tablet 3     No current facility-administered medications for this visit. ASSESSMENT / PLAN:    1. Essential hypertension  blood pressure stable, @ goal, controlled  Cont to monitor with lisinopril and monitor closely     2. Coronary artery calcification  Minimal findings  The patient is reassured that these symptoms do not appear to represent a serious or threatening condition. Discussed statin therapy, will hold as calcium score only 1    3. Psychophysiological insomnia  Improved with seroquel qhs  Continue present management. 4. Anxiety  Monitor, cont seroquel    5. Hypercholesterolemia  Reviewed bloodwork, CT calcium score  Monitor with diet/exercise and recheck 6 months  Consider statin therapy    6. Dyspepsia  Asymptomatic with PPI therapy  - pantoprazole (PROTONIX) 40 MG tablet; Take 1 tablet by mouth daily  Dispense: 30 tablet; Refill: 5    7. Gastroesophageal reflux disease without esophagitis           Follow-up appointment:   Call or return to clinic prn if these symptoms worsen or fail to improve as anticipated. Discussed use, benefit, and side effects of all prescribed medications.   Barriers to medication compliance addressed. All patient questions answered. Pt voiced understanding. When applicable, patient's outside records were reviewed through [facility]. The patient has signed appropriate paperworks/consents.         The 10-year ASCVD risk score (Yane Cerda, et al., 2013) is: 6.2%    Values used to calculate the score:      Age: 53 years      Sex: Male      Is Non- : No      Diabetic: No      Tobacco smoker: No      Systolic Blood Pressure: 089 mmHg      Is BP treated: Yes      HDL Cholesterol: 49 mg/dL      Total Cholesterol: 250 mg/dL

## 2020-12-23 VITALS
HEART RATE: 110 BPM | BODY MASS INDEX: 26.67 KG/M2 | SYSTOLIC BLOOD PRESSURE: 132 MMHG | RESPIRATION RATE: 20 BRPM | OXYGEN SATURATION: 99 % | TEMPERATURE: 97.9 F | DIASTOLIC BLOOD PRESSURE: 84 MMHG | WEIGHT: 183.2 LBS

## 2020-12-28 RX ORDER — LISINOPRIL 20 MG/1
20 TABLET ORAL DAILY
Qty: 90 TABLET | Refills: 3 | Status: SHIPPED | OUTPATIENT
Start: 2020-12-28 | End: 2022-01-03

## 2020-12-28 NOTE — TELEPHONE ENCOUNTER
Requested Prescriptions     Pending Prescriptions Disp Refills    lisinopril (PRINIVIL;ZESTRIL) 20 MG tablet [Pharmacy Med Name: LISINOPRIL 20MG TABLETS] 90 tablet 3     Sig: TAKE 1 TABLET BY MOUTH DAILY     Last ov  12/22/20  Last labs 10/09/20

## 2021-04-15 ENCOUNTER — VIRTUAL VISIT (OUTPATIENT)
Dept: FAMILY MEDICINE CLINIC | Age: 51
End: 2021-04-15
Payer: COMMERCIAL

## 2021-04-15 ENCOUNTER — TELEPHONE (OUTPATIENT)
Dept: FAMILY MEDICINE CLINIC | Age: 51
End: 2021-04-15

## 2021-04-15 DIAGNOSIS — Z11.59 ENCOUNTER FOR HEPATITIS C SCREENING TEST FOR LOW RISK PATIENT: ICD-10-CM

## 2021-04-15 DIAGNOSIS — R10.30 LOWER ABDOMINAL PAIN: ICD-10-CM

## 2021-04-15 DIAGNOSIS — R50.9 FEVER, UNSPECIFIED FEVER CAUSE: ICD-10-CM

## 2021-04-15 DIAGNOSIS — R10.30 LOWER ABDOMINAL PAIN: Primary | ICD-10-CM

## 2021-04-15 LAB
BASOPHILS ABSOLUTE: 0 K/UL (ref 0–0.2)
BASOPHILS RELATIVE PERCENT: 0.3 %
EOSINOPHILS ABSOLUTE: 0 K/UL (ref 0–0.6)
EOSINOPHILS RELATIVE PERCENT: 0.4 %
HCT VFR BLD CALC: 42.1 % (ref 40.5–52.5)
HEMOGLOBIN: 14.5 G/DL (ref 13.5–17.5)
LYMPHOCYTES ABSOLUTE: 1 K/UL (ref 1–5.1)
LYMPHOCYTES RELATIVE PERCENT: 11.1 %
MCH RBC QN AUTO: 32.9 PG (ref 26–34)
MCHC RBC AUTO-ENTMCNC: 34.4 G/DL (ref 31–36)
MCV RBC AUTO: 95.7 FL (ref 80–100)
MONOCYTES ABSOLUTE: 1 K/UL (ref 0–1.3)
MONOCYTES RELATIVE PERCENT: 11 %
NEUTROPHILS ABSOLUTE: 7 K/UL (ref 1.7–7.7)
NEUTROPHILS RELATIVE PERCENT: 77.2 %
PDW BLD-RTO: 12.2 % (ref 12.4–15.4)
PLATELET # BLD: 209 K/UL (ref 135–450)
PMV BLD AUTO: 7.9 FL (ref 5–10.5)
RBC # BLD: 4.41 M/UL (ref 4.2–5.9)
WBC # BLD: 9.1 K/UL (ref 4–11)

## 2021-04-15 PROCEDURE — 3017F COLORECTAL CA SCREEN DOC REV: CPT | Performed by: FAMILY MEDICINE

## 2021-04-15 PROCEDURE — G8427 DOCREV CUR MEDS BY ELIG CLIN: HCPCS | Performed by: FAMILY MEDICINE

## 2021-04-15 PROCEDURE — 99214 OFFICE O/P EST MOD 30 MIN: CPT | Performed by: FAMILY MEDICINE

## 2021-04-15 RX ORDER — AMOXICILLIN AND CLAVULANATE POTASSIUM 875; 125 MG/1; MG/1
1 TABLET, FILM COATED ORAL 2 TIMES DAILY
Qty: 20 TABLET | Refills: 0 | Status: SHIPPED | OUTPATIENT
Start: 2021-04-15 | End: 2021-04-25

## 2021-04-15 ASSESSMENT — PATIENT HEALTH QUESTIONNAIRE - PHQ9
SUM OF ALL RESPONSES TO PHQ QUESTIONS 1-9: 0
2. FEELING DOWN, DEPRESSED OR HOPELESS: 0
SUM OF ALL RESPONSES TO PHQ QUESTIONS 1-9: 0

## 2021-04-15 NOTE — TELEPHONE ENCOUNTER
Per Adela Santana with [facility], CT scan does require a pre-cert. Fax clinicals plus order for CT to [facility] asap please to fax # [phone]. The pre-cert person with [facility] will get right on it.

## 2021-04-15 NOTE — PROGRESS NOTES
4/15/2021    TELEHEALTH EVALUATION -- Audio/Visual (During FQBCT-03 public health emergency)    HPI:    Jace Luis (:  1970) has requested an audio/video evaluation for the following concern(s):    Abdominal pain    Cam Gin complains of abdominal pain 2 days. The pain is in lower abdomen, central. The pain is cramping, at times doubles him over. Tender to push above the pubic bone. He noticed a smoky smell to the urine yesterday. Denies hematuria, dysuria, frequency, penile discharge. Has pain with BM in the suprapubic area; no rectal pain. Had one small loose stool this am then 2 small more formed stool. No maureen or hematochezia.  + lower back pain. Mild nausea, no vomiting. No new sex partners. Feels feverish. Temp to 100. Has never had sxs like this before. Had prostatitis in the past; this feels different. Rx;     Lab Results   Component Value Date     10/09/2020    K 4.3 10/09/2020     10/09/2020    CO2 26 10/09/2020    BUN 15 10/09/2020    CREATININE 0.9 10/09/2020    GLUCOSE 86 10/09/2020    CALCIUM 10.1 10/09/2020    PROT 7.1 10/09/2020    LABALBU 4.5 10/09/2020    BILITOT 0.4 10/09/2020    ALKPHOS 58 10/09/2020    AST 30 10/09/2020    ALT 41 (H) 10/09/2020    LABGLOM >60 10/09/2020    GFRAA >60 10/09/2020    AGRATIO 1.7 10/09/2020    GLOB 2.6 10/09/2020        Lab Results   Component Value Date    WBC 4.9 10/09/2020    HGB 15.6 10/09/2020    HCT 46.2 10/09/2020    MCV 95.5 10/09/2020     10/09/2020      Lab Results   Component Value Date    COLORU YELLOW 2019    NITRU Negative 2019    GLUCOSEU Negative 2019    KETUA Negative 2019    UROBILINOGEN 0.2 2019    BILIRUBINUR Negative 2019    BILIRUBINUR neg 12/15/2016        Review of Systems    Prior to Visit Medications    Medication Sig Taking?  Authorizing Provider   lisinopril (PRINIVIL;ZESTRIL) 20 MG tablet TAKE 1 TABLET BY MOUTH DAILY  Elizabeth Gipson MD   pantoprazole (PROTONIX) 40 MG tablet Take 1 tablet by mouth daily  Eileen Travis MD   QUEtiapine (SEROQUEL) 25 MG tablet Take 1-2 tablets by mouth nightly  Eileen Travis MD       Social History     Tobacco Use    Smoking status: Never Smoker    Smokeless tobacco: Never Used   Substance Use Topics    Alcohol use: Yes     Comment: social     Drug use: Never        Allergies   Allergen Reactions    Levaquin [Levofloxacin]      Malaise     ,   Past Medical History:   Diagnosis Date    Anxiety     BPH (benign prostatic hyperplasia) 4/4/2013    Coronary artery calcification     Hypercholesterolemia     Nephrolithiasis 2000    Seasonal allergic rhinitis 7/22/2019   , No past surgical history on file.     PHYSICAL EXAMINATION:  [ INSTRUCTIONS:  \"[x]\" Indicates a positive item  \"[]\" Indicates a negative item  -- DELETE ALL ITEMS NOT EXAMINED]  Vital Signs: (As obtained by patient/caregiver or practitioner observation)    Blood pressure-  Heart rate-    Respiratory rate-    Temperature- 99.5 Pulse oximetry-   Wt 180 lb  Wt Readings from Last 3 Encounters:   12/22/20 183 lb 3.2 oz (83.1 kg)   11/17/20 169 lb 9.6 oz (76.9 kg)   10/09/20 180 lb (81.6 kg)     Temp Readings from Last 3 Encounters:   12/22/20 97.9 °F (36.6 °C) (Temporal)   11/17/20 95.9 °F (35.5 °C) (Temporal)   10/09/20 97.8 °F (36.6 °C) (Temporal)     BP Readings from Last 3 Encounters:   12/23/20 132/84   11/17/20 126/80   10/09/20 137/87     Pulse Readings from Last 3 Encounters:   12/22/20 110   11/17/20 92   10/09/20 84      Constitutional: [x] Appears well-developed and well-nourished [x] No apparent distress      [] Abnormal-   Mental status  [x] Alert and awake  [x] Oriented to person/place/time [x]Able to follow commands      Eyes:  EOM    [x]  Normal  [] Abnormal-  Sclera  [x]  Normal  [] Abnormal -         Discharge [x]  None visible  [] Abnormal -      Neck: [x] No visualized mass     Pulmonary/Chest: [x] Respiratory effort normal.  [x] No visualized signs of difficulty breathing or respiratory distress        [] Abnormal-             Skin:        [x] No significant exanthematous lesions or discoloration noted on facial skin         [] Abnormal-            Psychiatric:       [x] Normal Affect        [] Abnormal-     Other pertinent observable physical exam findings-     ASSESSMENT/PLAN:  1. Lower abdominal pain  Rule out diverticulitis (most likely) verses UTI/prostatitis  Push fluids, low residue diet. If diverticulitis start fiber supplement after sxs resolve  Will need colonoscopy once resolved, discussed. Augmentin  Call if increasing pain, blood stool, vomiting, not improved 48 to 72 hours. - URINALYSIS WITH MICROSCOPIC; Future  - Culture, Urine; Future  - CBC WITH AUTO DIFFERENTIAL; Future  - Comprehensive Metabolic Panel; Future  - CT ABDOMEN PELVIS W WO CONTRAST; Future    2. Fever, unspecified fever cause    - CT ABDOMEN PELVIS W WO CONTRAST; Future    3. Encounter for hepatitis C screening test for low risk patient    - Hepatitis C Antibody; Future      Follow up in 2 weeks or prn. No follow-ups on file. Yoalnda Traylor, was evaluated through a synchronous (real-time) audio-video encounter. The patient (or guardian if applicable) is aware that this is a billable service. Verbal consent to proceed has been obtained within the past 12 months. The visit was conducted pursuant to the emergency declaration under the [address] [place] [address] authority and the [employer] General Act. Patient identification was verified, and a caregiver was present when appropriate. The patient was located in a state where the provider was credentialed to provide care. Total time spent on this encounter: Not billed by time    --Tarik Monet MD on 4/15/2021 at 9:24 AM    An electronic signature was used to authenticate this note.

## 2021-04-16 LAB
A/G RATIO: 1.7 (ref 1.1–2.2)
ALBUMIN SERPL-MCNC: 4.5 G/DL (ref 3.4–5)
ALP BLD-CCNC: 60 U/L (ref 40–129)
ALT SERPL-CCNC: 27 U/L (ref 10–40)
ANION GAP SERPL CALCULATED.3IONS-SCNC: 11 MMOL/L (ref 3–16)
AST SERPL-CCNC: 23 U/L (ref 15–37)
BILIRUB SERPL-MCNC: 0.8 MG/DL (ref 0–1)
BILIRUBIN URINE: NEGATIVE
BLOOD, URINE: NEGATIVE
BUN BLDV-MCNC: 11 MG/DL (ref 7–20)
CALCIUM SERPL-MCNC: 9.5 MG/DL (ref 8.3–10.6)
CHLORIDE BLD-SCNC: 95 MMOL/L (ref 99–110)
CLARITY: CLEAR
CO2: 27 MMOL/L (ref 21–32)
COLOR: YELLOW
CREAT SERPL-MCNC: 0.9 MG/DL (ref 0.9–1.3)
EPITHELIAL CELLS, UA: 0 /HPF (ref 0–5)
GFR AFRICAN AMERICAN: >60
GFR NON-AFRICAN AMERICAN: >60
GLOBULIN: 2.7 G/DL
GLUCOSE BLD-MCNC: 90 MG/DL (ref 70–99)
GLUCOSE URINE: NEGATIVE MG/DL
HEPATITIS C ANTIBODY INTERPRETATION: NORMAL
HYALINE CASTS: 1 /LPF (ref 0–8)
KETONES, URINE: NEGATIVE MG/DL
LEUKOCYTE ESTERASE, URINE: NEGATIVE
MICROSCOPIC EXAMINATION: YES
NITRITE, URINE: NEGATIVE
PH UA: 6 (ref 5–8)
POTASSIUM SERPL-SCNC: 4.9 MMOL/L (ref 3.5–5.1)
PROTEIN UA: ABNORMAL MG/DL
RBC UA: 1 /HPF (ref 0–4)
SODIUM BLD-SCNC: 133 MMOL/L (ref 136–145)
SPECIFIC GRAVITY UA: >1.03 (ref 1–1.03)
TOTAL PROTEIN: 7.2 G/DL (ref 6.4–8.2)
URINE TYPE: ABNORMAL
UROBILINOGEN, URINE: 0.2 E.U./DL
WBC UA: 1 /HPF (ref 0–5)

## 2021-04-17 LAB — URINE CULTURE, ROUTINE: NORMAL

## 2021-11-18 ENCOUNTER — OFFICE VISIT (OUTPATIENT)
Dept: FAMILY MEDICINE CLINIC | Age: 51
End: 2021-11-18
Payer: COMMERCIAL

## 2021-11-18 VITALS
BODY MASS INDEX: 26.34 KG/M2 | HEIGHT: 68 IN | TEMPERATURE: 98 F | DIASTOLIC BLOOD PRESSURE: 80 MMHG | OXYGEN SATURATION: 99 % | HEART RATE: 94 BPM | SYSTOLIC BLOOD PRESSURE: 120 MMHG | WEIGHT: 173.8 LBS | RESPIRATION RATE: 12 BRPM

## 2021-11-18 DIAGNOSIS — Z00.00 ROUTINE GENERAL MEDICAL EXAMINATION AT A HEALTH CARE FACILITY: Primary | ICD-10-CM

## 2021-11-18 DIAGNOSIS — Z12.11 SCREEN FOR COLON CANCER: ICD-10-CM

## 2021-11-18 DIAGNOSIS — I10 ESSENTIAL HYPERTENSION: ICD-10-CM

## 2021-11-18 DIAGNOSIS — R10.32 LLQ ABDOMINAL PAIN: ICD-10-CM

## 2021-11-18 DIAGNOSIS — N40.1 BENIGN PROSTATIC HYPERPLASIA WITH URINARY FREQUENCY: ICD-10-CM

## 2021-11-18 DIAGNOSIS — R10.84 ABDOMINAL PAIN, GENERALIZED: ICD-10-CM

## 2021-11-18 DIAGNOSIS — K57.92 ACUTE DIVERTICULITIS: ICD-10-CM

## 2021-11-18 DIAGNOSIS — E78.00 HYPERCHOLESTEROLEMIA: ICD-10-CM

## 2021-11-18 DIAGNOSIS — Z23 NEEDS FLU SHOT: ICD-10-CM

## 2021-11-18 DIAGNOSIS — Z23 NEED FOR SHINGLES VACCINE: ICD-10-CM

## 2021-11-18 DIAGNOSIS — R35.0 BENIGN PROSTATIC HYPERPLASIA WITH URINARY FREQUENCY: ICD-10-CM

## 2021-11-18 PROCEDURE — 99396 PREV VISIT EST AGE 40-64: CPT | Performed by: FAMILY MEDICINE

## 2021-11-18 PROCEDURE — G8484 FLU IMMUNIZE NO ADMIN: HCPCS | Performed by: FAMILY MEDICINE

## 2021-11-18 RX ORDER — AMOXICILLIN AND CLAVULANATE POTASSIUM 875; 125 MG/1; MG/1
1 TABLET, FILM COATED ORAL 2 TIMES DAILY
Qty: 20 TABLET | Refills: 0 | Status: SHIPPED | OUTPATIENT
Start: 2021-11-18 | End: 2021-11-28

## 2021-11-18 SDOH — ECONOMIC STABILITY: FOOD INSECURITY: WITHIN THE PAST 12 MONTHS, YOU WORRIED THAT YOUR FOOD WOULD RUN OUT BEFORE YOU GOT MONEY TO BUY MORE.: NEVER TRUE

## 2021-11-18 SDOH — ECONOMIC STABILITY: FOOD INSECURITY: WITHIN THE PAST 12 MONTHS, THE FOOD YOU BOUGHT JUST DIDN'T LAST AND YOU DIDN'T HAVE MONEY TO GET MORE.: NEVER TRUE

## 2021-11-18 ASSESSMENT — SOCIAL DETERMINANTS OF HEALTH (SDOH): HOW HARD IS IT FOR YOU TO PAY FOR THE VERY BASICS LIKE FOOD, HOUSING, MEDICAL CARE, AND HEATING?: NOT HARD AT ALL

## 2021-11-18 NOTE — PROGRESS NOTES
Here for well checkup, physical.  Pt states that overall things are doing well, is staying healthy and is managing the stress of COVID. Pt states that in the past 1 week, has noted some increase in abd pain. Sx for about 1 week. Pt has had some issues in the past with prostatitis and did have some lower abd pain as well. Pt noted some urinary frequency and difficulty with urination. Pt was also concerned about having kidney stone. Sx seem now bilateral lower. Pt has continued to work, but is uncomfortable. No emesis, bowels are normal.  No blood in urine. Pt did have diverticulitis a few months ago which presented with only a fever and some lower abd pain. Not taking anything, but has cut down on EtOH, coffee. Feels sx in abdomen when driving    Pt here for follow up of blood pressure. Pt states doing great with adherence to therapy and feels well. No issues of chest pain, shortness of breath. No vision changes, headache, swelling in legs. Here for f/u of cholesterol. Pt as been working on diet/exercise and is consistent with therapy. No side effects from current therapy. No chest pain, shortness of breath. No numbness/tingling in extremities       Except as noted in the history of present illness as above, the review of systems is negative for the following:    General ROS: negative  Psychological ROS: negative  Allergy and Immunology ROS: negative  Hematological and Lymphatic ROS: negative  Respiratory ROS: no cough, shortness of breath, or wheezing  Cardiovascular ROS: no chest pain or dyspnea on exertion  Gastrointestinal ROS: no abdominal pain, change in bowel habits, or black or bloody stools  Genito-Urinary ROS: no dysuria, trouble voiding, or hematuria  Musculoskeletal ROS: negative  Dermatological ROS: negative      Past medical, surgical, and social history reviewed and updated.    Medications and allergies reviewed and updated      /80   Pulse 94   Temp 98 °F (36.7 °C) tablet 3    pantoprazole (PROTONIX) 40 MG tablet Take 1 tablet by mouth daily (Patient taking differently: Take 40 mg by mouth daily as needed ) 30 tablet 5     No current facility-administered medications for this visit. ASSESSMENT / PLAN:    1. Routine general medical examination at a health care facility  No focal abnormalities on exam  Anticipatory guidance discussed. Check bloodwork as below  - Lipid Panel; Future  - Hemoglobin A1C; Future  - CBC Auto Differential; Future    2. Essential hypertension  blood pressure stable @ goal, controlled    3. Abdominal pain, generalized  Suspect acute diverticuiltis  tx with augmentin 875mg BID x 10d  Consider CT for persistent or increased sx severity    4. Benign prostatic hyperplasia with urinary frequency  Check psa  - PSA, Prostatic Specific Antigen; Future    5. Hypercholesterolemia  Check cmp, lipids    6. Needs flu shot  Hold today d/t acute infection  Encouraged f/u for vaccine    7. Need for shingles vaccine  Hold today d/t acute infection  Encouraged f/u for vaccine    8. Screen for colon cancer  Working to set up  Needs to have done, jacklyn in light of prior issues of recurrent diverticulitis    9. LLQ abdominal pain  As above  tx for diverticulitis  - Comprehensive Metabolic Panel; Future  - Urinalysis; Future    10. Acute diverticulitis  augmentin 875mg BID x 10d  Consider CT for persistent or increased sx  Needs colonoscopy, to set up after resolution of sx           Follow-up appointment:   Pending bloodwork results    Discussed use, benefit, and side effects of all prescribed medications. Barriers to medication compliance addressed. All patient questions answered. Pt voiced understanding. When applicable, patient's outside records were reviewed through [facility]. The patient has signed appropriate paperworks/consents.

## 2021-11-19 DIAGNOSIS — R35.0 BENIGN PROSTATIC HYPERPLASIA WITH URINARY FREQUENCY: ICD-10-CM

## 2021-11-19 DIAGNOSIS — R10.32 LLQ ABDOMINAL PAIN: ICD-10-CM

## 2021-11-19 DIAGNOSIS — N40.1 BENIGN PROSTATIC HYPERPLASIA WITH URINARY FREQUENCY: ICD-10-CM

## 2021-11-19 DIAGNOSIS — Z00.00 ROUTINE GENERAL MEDICAL EXAMINATION AT A HEALTH CARE FACILITY: ICD-10-CM

## 2021-11-19 LAB
A/G RATIO: 2.2 (ref 1.1–2.2)
ALBUMIN SERPL-MCNC: 5 G/DL (ref 3.4–5)
ALP BLD-CCNC: 49 U/L (ref 40–129)
ALT SERPL-CCNC: 47 U/L (ref 10–40)
ANION GAP SERPL CALCULATED.3IONS-SCNC: 12 MMOL/L (ref 3–16)
AST SERPL-CCNC: 28 U/L (ref 15–37)
BASOPHILS ABSOLUTE: 0 K/UL (ref 0–0.2)
BASOPHILS RELATIVE PERCENT: 0.5 %
BILIRUB SERPL-MCNC: 0.6 MG/DL (ref 0–1)
BILIRUBIN URINE: NEGATIVE
BLOOD, URINE: NEGATIVE
BUN BLDV-MCNC: 17 MG/DL (ref 7–20)
CALCIUM SERPL-MCNC: 10 MG/DL (ref 8.3–10.6)
CHLORIDE BLD-SCNC: 100 MMOL/L (ref 99–110)
CHOLESTEROL, TOTAL: 254 MG/DL (ref 0–199)
CLARITY: CLEAR
CO2: 26 MMOL/L (ref 21–32)
COLOR: YELLOW
CREAT SERPL-MCNC: 0.9 MG/DL (ref 0.9–1.3)
EOSINOPHILS ABSOLUTE: 0.1 K/UL (ref 0–0.6)
EOSINOPHILS RELATIVE PERCENT: 2.8 %
GFR AFRICAN AMERICAN: >60
GFR NON-AFRICAN AMERICAN: >60
GLUCOSE BLD-MCNC: 96 MG/DL (ref 70–99)
GLUCOSE URINE: NEGATIVE MG/DL
HCT VFR BLD CALC: 46.4 % (ref 40.5–52.5)
HDLC SERPL-MCNC: 52 MG/DL (ref 40–60)
HEMOGLOBIN: 15.5 G/DL (ref 13.5–17.5)
KETONES, URINE: NEGATIVE MG/DL
LDL CHOLESTEROL CALCULATED: 175 MG/DL
LEUKOCYTE ESTERASE, URINE: NEGATIVE
LIPASE: 40 U/L (ref 13–60)
LYMPHOCYTES ABSOLUTE: 1.1 K/UL (ref 1–5.1)
LYMPHOCYTES RELATIVE PERCENT: 28.9 %
MCH RBC QN AUTO: 31.8 PG (ref 26–34)
MCHC RBC AUTO-ENTMCNC: 33.4 G/DL (ref 31–36)
MCV RBC AUTO: 95.5 FL (ref 80–100)
MICROSCOPIC EXAMINATION: NORMAL
MONOCYTES ABSOLUTE: 0.5 K/UL (ref 0–1.3)
MONOCYTES RELATIVE PERCENT: 13.9 %
NEUTROPHILS ABSOLUTE: 2 K/UL (ref 1.7–7.7)
NEUTROPHILS RELATIVE PERCENT: 53.9 %
NITRITE, URINE: NEGATIVE
PDW BLD-RTO: 12.3 % (ref 12.4–15.4)
PH UA: 6 (ref 5–8)
PLATELET # BLD: 212 K/UL (ref 135–450)
PMV BLD AUTO: 8.1 FL (ref 5–10.5)
POTASSIUM SERPL-SCNC: 5.2 MMOL/L (ref 3.5–5.1)
PROSTATE SPECIFIC ANTIGEN: 2.99 NG/ML (ref 0–4)
PROTEIN UA: NEGATIVE MG/DL
RBC # BLD: 4.86 M/UL (ref 4.2–5.9)
SODIUM BLD-SCNC: 138 MMOL/L (ref 136–145)
SPECIFIC GRAVITY UA: 1.02 (ref 1–1.03)
TOTAL PROTEIN: 7.3 G/DL (ref 6.4–8.2)
TRIGL SERPL-MCNC: 133 MG/DL (ref 0–150)
URINE TYPE: NORMAL
UROBILINOGEN, URINE: 0.2 E.U./DL
VLDLC SERPL CALC-MCNC: 27 MG/DL
WBC # BLD: 3.7 K/UL (ref 4–11)

## 2021-11-20 LAB
ESTIMATED AVERAGE GLUCOSE: 111.2 MG/DL
HBA1C MFR BLD: 5.5 %

## 2021-12-03 ENCOUNTER — PATIENT MESSAGE (OUTPATIENT)
Dept: FAMILY MEDICINE CLINIC | Age: 51
End: 2021-12-03

## 2021-12-03 NOTE — TELEPHONE ENCOUNTER
Hi Dr. Deric Iqbal better almost immediately after starting antibiotic. After finishing them earlier this week, some symptoms were still slightly present but I thought it might just be residual pain left over from the infection. As week has gone on headache, back pain, hot flashes and frequent urge to urinate are persistent.  I have made a follow up appointment for 2 weeks from now to discuss my lab results, but Im wondering if need another round of antibiotics before then.     Please advise  Thank you

## 2021-12-03 NOTE — TELEPHONE ENCOUNTER
From: Burt Jo  To: Dr. Milla Roberto: 12/3/2021 4:35 PM EST  Subject: Antibiotics     Hi Dr. Teresia Cockayne better almost immediately after starting antibiotic. After finishing them earlier this week, some symptoms were still slightly present but I thought it might just be residual pain left over from the infection. As week has gone on headache, back pain, hot flashes and frequent urge to urinate are persistent. I have made a follow up appointment for 2 weeks from now to discuss my lab results, but Im wondering if need another round of antibiotics before then.

## 2021-12-05 RX ORDER — SULFAMETHOXAZOLE AND TRIMETHOPRIM 800; 160 MG/1; MG/1
1 TABLET ORAL 2 TIMES DAILY
Qty: 20 TABLET | Refills: 0 | Status: SHIPPED | OUTPATIENT
Start: 2021-12-05 | End: 2021-12-15

## 2021-12-17 ENCOUNTER — OFFICE VISIT (OUTPATIENT)
Dept: FAMILY MEDICINE CLINIC | Age: 51
End: 2021-12-17
Payer: COMMERCIAL

## 2021-12-17 VITALS
WEIGHT: 179 LBS | HEART RATE: 78 BPM | DIASTOLIC BLOOD PRESSURE: 80 MMHG | OXYGEN SATURATION: 99 % | RESPIRATION RATE: 14 BRPM | TEMPERATURE: 97.5 F | SYSTOLIC BLOOD PRESSURE: 118 MMHG | BODY MASS INDEX: 27.22 KG/M2

## 2021-12-17 DIAGNOSIS — I10 ESSENTIAL HYPERTENSION: ICD-10-CM

## 2021-12-17 DIAGNOSIS — R10.84 ABDOMINAL PAIN, GENERALIZED: ICD-10-CM

## 2021-12-17 DIAGNOSIS — E78.00 HYPERCHOLESTEROLEMIA: ICD-10-CM

## 2021-12-17 DIAGNOSIS — F41.9 ANXIETY: ICD-10-CM

## 2021-12-17 DIAGNOSIS — R10.32 LLQ ABDOMINAL PAIN: Primary | ICD-10-CM

## 2021-12-17 DIAGNOSIS — Z12.11 SCREEN FOR COLON CANCER: ICD-10-CM

## 2021-12-17 PROCEDURE — 1036F TOBACCO NON-USER: CPT | Performed by: FAMILY MEDICINE

## 2021-12-17 PROCEDURE — G8484 FLU IMMUNIZE NO ADMIN: HCPCS | Performed by: FAMILY MEDICINE

## 2021-12-17 PROCEDURE — 99214 OFFICE O/P EST MOD 30 MIN: CPT | Performed by: FAMILY MEDICINE

## 2021-12-17 PROCEDURE — G8419 CALC BMI OUT NRM PARAM NOF/U: HCPCS | Performed by: FAMILY MEDICINE

## 2021-12-17 PROCEDURE — 3017F COLORECTAL CA SCREEN DOC REV: CPT | Performed by: FAMILY MEDICINE

## 2021-12-17 PROCEDURE — G8427 DOCREV CUR MEDS BY ELIG CLIN: HCPCS | Performed by: FAMILY MEDICINE

## 2021-12-17 RX ORDER — LORAZEPAM 0.5 MG/1
TABLET ORAL
Qty: 90 TABLET | Refills: 0 | Status: SHIPPED | OUTPATIENT
Start: 2021-12-17 | End: 2022-07-26 | Stop reason: SDUPTHER

## 2021-12-17 NOTE — PROGRESS NOTES
Here for f/u and recheck of abd pain. Pt was seen with some mild persistent abd pain, pt was treated with augmentin and sx did seem to improve but not sure if it was 100% improved. Pt finished abx and did not feel 100% better, but then felt better. Pt emailed us with some recurrent sx and notified us, and was transitioned over to bactrim. Pt monitored sx for a few days and sx started to increase, and 2d ago started bactrim. Pt has had a total of 5, and so far sx have improved. Yesterday AM, felt much better but then in the next few hours felt malaise, lower back pain and some persistent malaise. Pain now in midback and to abdomen bilaterally. Pt has not noticed any fever, chills. Has had some nausea. Pt has noted some recurrent urinary sx, with some increase in nocturia in the past few days. Appetite is ok. bloodwork was ok, and urine was normal.  Lipase was normal.  Pt is set for colonoscopy in 1/7/2022. Here for f/u of cholesterol. Pt as been working on diet/exercise and is consistent with therapy. No side effects from current therapy. No chest pain, shortness of breath. No numbness/tingling in extremities     Pt here for follow up of blood pressure. Pt states doing great with adherence to therapy and feels well. No issues of chest pain, shortness of breath. No vision changes, headache, swelling in legs. Except as noted above in the history of present illness, the review of systems is  negative for headache, vision changes, chest pain, shortness of breath, urinary sx, bowel changes. Past medical, surgical, and social history reviewed and updated  Medications and allergies reviewed and updated      O: /80   Pulse 78   Temp 97.5 °F (36.4 °C) (Temporal)   Resp 14   Wt 179 lb (81.2 kg)   SpO2 99%   BMI 27.22 kg/m²   GEN: No acute distress, cooperative, well nourished, alert. HEENT: PEERLA, EOMI , normocephalic/atraumatic, nares and oropharynx clear.   Mucous membranes normal, Tympanic membranes clear bilaterally. Neck: soft, supple, no thyromegaly, mass, no Lymphadenopathy  CV: Regular rate and rhythm, no murmur, rubs, gallops. No edema. Resp: Clear to auscultation bilaterally good air entry bilaterally  No crackles, wheeze. Breathing comfortably. Psych: mood stable, No suicidal thoughts or ideation   Abd: soft, mild LLQ tender to palpation. normoactive bowels sounds.  No hepatosplenomegaly  No costovertebral angle tenderness         Orders Only on 11/19/2021   Component Date Value Ref Range Status    WBC 11/19/2021 3.7* 4.0 - 11.0 K/uL Final    RBC 11/19/2021 4.86  4.20 - 5.90 M/uL Final    Hemoglobin 11/19/2021 15.5  13.5 - 17.5 g/dL Final    Hematocrit 11/19/2021 46.4  40.5 - 52.5 % Final    MCV 11/19/2021 95.5  80.0 - 100.0 fL Final    MCH 11/19/2021 31.8  26.0 - 34.0 pg Final    MCHC 11/19/2021 33.4  31.0 - 36.0 g/dL Final    RDW 11/19/2021 12.3* 12.4 - 15.4 % Final    Platelets 95/80/4119 212  135 - 450 K/uL Final    MPV 11/19/2021 8.1  5.0 - 10.5 fL Final    Neutrophils % 11/19/2021 53.9  % Final    Lymphocytes % 11/19/2021 28.9  % Final    Monocytes % 11/19/2021 13.9  % Final    Eosinophils % 11/19/2021 2.8  % Final    Basophils % 11/19/2021 0.5  % Final    Neutrophils Absolute 11/19/2021 2.0  1.7 - 7.7 K/uL Final    Lymphocytes Absolute 11/19/2021 1.1  1.0 - 5.1 K/uL Final    Monocytes Absolute 11/19/2021 0.5  0.0 - 1.3 K/uL Final    Eosinophils Absolute 11/19/2021 0.1  0.0 - 0.6 K/uL Final    Basophils Absolute 11/19/2021 0.0  0.0 - 0.2 K/uL Final    Color, UA 11/19/2021 YELLOW  Straw/Yellow Final    Clarity, UA 11/19/2021 Clear  Clear Final    Glucose, Ur 11/19/2021 Negative  Negative mg/dL Final    Bilirubin Urine 11/19/2021 Negative  Negative Final    Ketones, Urine 11/19/2021 Negative  Negative mg/dL Final    Specific [place], UA 11/19/2021 1.022  1.005 - 1.030 Final    Blood, Urine 11/19/2021 Negative  Negative Final    pH, UA 11/19/2021 6.0  5.0 - 8.0 Final    Protein, UA 11/19/2021 Negative  Negative mg/dL Final    Urobilinogen, Urine 11/19/2021 0.2  <2.0 E.U./dL Final    Nitrite, Urine 11/19/2021 Negative  Negative Final    Leukocyte Esterase, Urine 11/19/2021 Negative  Negative Final    Microscopic Examination 11/19/2021 Not Indicated   Final    Urine Type 11/19/2021 Cleancatch   Final    PSA 11/19/2021 2.99  0.00 - 4.00 ng/mL Final    Hemoglobin A1C 11/19/2021 5.5  See comment % Final    Comment: Comment:  Diagnosis of Diabetes: > or = 6.5%  Increased risk of diabetes (Prediabetes): 5.7-6.4%  Glycemic Control: Nonpregnant Adults: <7.0%                    Pregnant: <6.0%        eAG 11/19/2021 111.2  mg/dL Final    Lipase 11/19/2021 40.0  13.0 - 60.0 U/L Final    Cholesterol, Total 11/19/2021 254* 0 - 199 mg/dL Final    Triglycerides 11/19/2021 133  0 - 150 mg/dL Final    HDL 11/19/2021 52  40 - 60 mg/dL Final    LDL Calculated 11/19/2021 175* <100 mg/dL Final    VLDL Cholesterol Calculated 11/19/2021 27  Not Established mg/dL Final    Sodium 11/19/2021 138  136 - 145 mmol/L Final    Potassium 11/19/2021 5.2* 3.5 - 5.1 mmol/L Final    Chloride 11/19/2021 100  99 - 110 mmol/L Final    CO2 11/19/2021 26  21 - 32 mmol/L Final    Anion Gap 11/19/2021 12  3 - 16 Final    Glucose 11/19/2021 96  70 - 99 mg/dL Final    BUN 11/19/2021 17  7 - 20 mg/dL Final    CREATININE 11/19/2021 0.9  0.9 - 1.3 mg/dL Final    GFR Non- 11/19/2021 >60  >60 Final    Comment: >60 mL/min/1.73m2 EGFR, calc. for ages 25 and older using the  MDRD formula (not corrected for weight), is valid for stable  renal function.  GFR  11/19/2021 >60  >60 Final    Comment: Chronic Kidney Disease: less than 60 ml/min/1.73 sq.m. Kidney Failure: less than 15 ml/min/1.73 sq.m. Results valid for patients 18 years and older.       Calcium 11/19/2021 10.0  8.3 - 10.6 mg/dL Final    Total Protein 11/19/2021 7.3  6.4 - 8.2 g/dL Final    Albumin 11/19/2021 5.0  3.4 - 5.0 g/dL Final    Albumin/Globulin Ratio 11/19/2021 2.2  1.1 - 2.2 Final    Total Bilirubin 11/19/2021 0.6  0.0 - 1.0 mg/dL Final    Alkaline Phosphatase 11/19/2021 49  40 - 129 U/L Final    ALT 11/19/2021 47* 10 - 40 U/L Final    AST 11/19/2021 28  15 - 37 U/L Final          The 10-year ASCVD risk score (Charles Aguilar, et al., 2013) is: 4.7%    Values used to calculate the score:      Age: 49 years      Sex: Male      Is Non- : No      Diabetic: No      Tobacco smoker: No      Systolic Blood Pressure: 479 mmHg      Is BP treated: Yes      HDL Cholesterol: 52 mg/dL      Total Cholesterol: 254 mg/dL      ASSESSMENT / PLAN:    1. LLQ abdominal pain  Improved with incomplete resolution  Suspect incompletely treated diverticulitis vs prostatitis  Cont bactrim DS BID x 10d  Call w/ update Monday and if persistent sx, will check CT scan    2. Essential hypertension  Stable @ goal    3. Abdominal pain, generalized  As above  bloodwork normal/nonfocal    4. Screen for colon cancer  Working on setting up  Has in 2 weeks    5. Hypercholesterolemia  ASCVD risk 4.7%  Calcium score 1 yr ago with trace calcium  Monitor with diet/exercise and recheck 6-12 months  Consider f/u CT scan at that time    6. Anxiety  Stable w/o flare  Cont supportive therapy, ativan prn   Use intermittent  See [facility]  Pt aware of need for every 3 month medication followup appointments, and that medication refills for benzodiazepines, narcotics and/or stimulants will only be given at appointment.   - LORazepam (ATIVAN) 0.5 MG tablet; TAKE 1 TABLET BY MOUTH EVERY 8 HOURS AS NEEDED FOR ANXIETY  Dispense: 90 tablet; Refill: 0           Follow-up appointment:   Call w/ update on abd sx in 3d    Discussed use, benefit, and side effects of all prescribed medications. Barriers to medication compliance addressed. All patient questions answered. Pt voiced understanding.   When applicable, patient's outside records were reviewed through [facility]. The patient has signed appropriate paperworks/consents.

## 2022-01-03 RX ORDER — LISINOPRIL 20 MG/1
20 TABLET ORAL DAILY
Qty: 90 TABLET | Refills: 3 | Status: SHIPPED | OUTPATIENT
Start: 2022-01-03

## 2022-01-03 NOTE — TELEPHONE ENCOUNTER
Requested Prescriptions     Pending Prescriptions Disp Refills    lisinopril (PRINIVIL;ZESTRIL) 20 MG tablet [Pharmacy Med Name: LISINOPRIL 20MG TABLETS] 90 tablet 3     Sig: TAKE 1 TABLET BY MOUTH DAILY     Last ov 12/17/2021  Last labs 11/19/21

## 2022-06-16 ENCOUNTER — PATIENT MESSAGE (OUTPATIENT)
Dept: FAMILY MEDICINE CLINIC | Age: 52
End: 2022-06-16

## 2022-06-16 RX ORDER — AMOXICILLIN AND CLAVULANATE POTASSIUM 875; 125 MG/1; MG/1
1 TABLET, FILM COATED ORAL 2 TIMES DAILY
Qty: 20 TABLET | Refills: 0 | Status: SHIPPED | OUTPATIENT
Start: 2022-06-16 | End: 2022-06-26

## 2022-06-16 NOTE — TELEPHONE ENCOUNTER
From: Loyde Cockayne  To: Dr. Mcgarry Me: 6/16/2022 10:27 AM EDT  Subject: diverticulitis     Hi Dr Deonna Hernández, I had a pretty bad flare up the other day, same symptoms as before. Feeling better today after going on a mostly liquid diet, but still experiencing significant discomfort in my lower abdomen. I dont know if a flare up necessarily means an infection, so was wondering if I should have some antibiotics on hand in case diet and rest dont do the trick. Of course I will make an appointment if necessary. Thanks, Hudson       Pt c/o Mostly Abdominal cramps, especially lower left side which hurts to touch. Discomfort after eating, back pain & slight headache. Constipation and diarrhea.

## 2022-07-17 ENCOUNTER — PATIENT MESSAGE (OUTPATIENT)
Dept: FAMILY MEDICINE CLINIC | Age: 52
End: 2022-07-17

## 2022-07-18 NOTE — TELEPHONE ENCOUNTER
From: Latia Wing  To: Dr. Celestino Rosario: 7/17/2022 6:37 AM EDT  Subject: Quetiapine    Hi Dr. Laray Mortimer, I would like to get back on this medication as it proved to be very helpful in the past. Just not sure if I would need to make an appointment to get it refilled.

## 2022-07-26 ENCOUNTER — OFFICE VISIT (OUTPATIENT)
Dept: FAMILY MEDICINE CLINIC | Age: 52
End: 2022-07-26
Payer: COMMERCIAL

## 2022-07-26 VITALS
BODY MASS INDEX: 26.65 KG/M2 | RESPIRATION RATE: 12 BRPM | TEMPERATURE: 97.8 F | SYSTOLIC BLOOD PRESSURE: 132 MMHG | OXYGEN SATURATION: 99 % | HEART RATE: 96 BPM | WEIGHT: 175.3 LBS | DIASTOLIC BLOOD PRESSURE: 82 MMHG

## 2022-07-26 DIAGNOSIS — K59.09 CHRONIC CONSTIPATION: ICD-10-CM

## 2022-07-26 DIAGNOSIS — K57.30 DIVERTICULAR DISEASE OF COLON: ICD-10-CM

## 2022-07-26 DIAGNOSIS — Z12.11 SCREEN FOR COLON CANCER: ICD-10-CM

## 2022-07-26 DIAGNOSIS — K57.92 ACUTE DIVERTICULITIS: ICD-10-CM

## 2022-07-26 DIAGNOSIS — Z23 NEED FOR SHINGLES VACCINE: ICD-10-CM

## 2022-07-26 DIAGNOSIS — F41.9 ANXIETY: ICD-10-CM

## 2022-07-26 DIAGNOSIS — K21.9 GASTROESOPHAGEAL REFLUX DISEASE WITHOUT ESOPHAGITIS: ICD-10-CM

## 2022-07-26 DIAGNOSIS — I10 ESSENTIAL HYPERTENSION: Primary | ICD-10-CM

## 2022-07-26 DIAGNOSIS — R10.13 DYSPEPSIA: ICD-10-CM

## 2022-07-26 DIAGNOSIS — F43.9 STRESS: ICD-10-CM

## 2022-07-26 DIAGNOSIS — F51.01 PRIMARY INSOMNIA: ICD-10-CM

## 2022-07-26 PROCEDURE — 3017F COLORECTAL CA SCREEN DOC REV: CPT | Performed by: FAMILY MEDICINE

## 2022-07-26 PROCEDURE — 90750 HZV VACC RECOMBINANT IM: CPT | Performed by: FAMILY MEDICINE

## 2022-07-26 PROCEDURE — 90471 IMMUNIZATION ADMIN: CPT | Performed by: FAMILY MEDICINE

## 2022-07-26 PROCEDURE — G8419 CALC BMI OUT NRM PARAM NOF/U: HCPCS | Performed by: FAMILY MEDICINE

## 2022-07-26 PROCEDURE — G8427 DOCREV CUR MEDS BY ELIG CLIN: HCPCS | Performed by: FAMILY MEDICINE

## 2022-07-26 PROCEDURE — 99214 OFFICE O/P EST MOD 30 MIN: CPT | Performed by: FAMILY MEDICINE

## 2022-07-26 PROCEDURE — 1036F TOBACCO NON-USER: CPT | Performed by: FAMILY MEDICINE

## 2022-07-26 RX ORDER — LORAZEPAM 0.5 MG/1
TABLET ORAL
Qty: 90 TABLET | Refills: 2 | Status: SHIPPED | OUTPATIENT
Start: 2022-07-26 | End: 2022-10-24

## 2022-07-26 RX ORDER — QUETIAPINE FUMARATE 25 MG/1
25-50 TABLET, FILM COATED ORAL NIGHTLY
Qty: 60 TABLET | Refills: 5 | Status: SHIPPED | OUTPATIENT
Start: 2022-07-26

## 2022-07-26 RX ORDER — PANTOPRAZOLE SODIUM 40 MG/1
40 TABLET, DELAYED RELEASE ORAL DAILY
Qty: 30 TABLET | Refills: 5 | Status: SHIPPED | OUTPATIENT
Start: 2022-07-26 | End: 2022-09-24

## 2022-07-26 ASSESSMENT — PATIENT HEALTH QUESTIONNAIRE - PHQ9
2. FEELING DOWN, DEPRESSED OR HOPELESS: 0
SUM OF ALL RESPONSES TO PHQ9 QUESTIONS 1 & 2: 0
SUM OF ALL RESPONSES TO PHQ QUESTIONS 1-9: 0
1. LITTLE INTEREST OR PLEASURE IN DOING THINGS: 0
SUM OF ALL RESPONSES TO PHQ QUESTIONS 1-9: 0

## 2022-07-26 NOTE — PROGRESS NOTES
Here for f/u and recheck of blood pressure,     Pt state that things are doing ok overall. Pt states that he has been doing well, was on seroquel in the past for his sleep and was helpful, and did not find that it made him too sleepy. Pt does continue to use the lorazepam on a prn basis, last rx for 90 pills lasted about 7 months. Pt states that his stress levels have been higher, in 'all facets', including work and family. Pt is not doing any counseling/therapy at this time but would be interested in looking at setting up. Pt has been monitoring for issues of recurrent diverticulitis. Pt does not know any trigger. Pt was doing well with drinking fluids, fiber supplementation. Pt did have colonoscopy 12/2021 that looked of, did have a few polyps. No concerning findings. Pt is concerned with his recurrent diverticulitis flares. Pt wonders if he got dehydrated and that's what caused his symptoms. Except as noted above in the history of present illness, the review of systems is  negative for headache, vision changes, chest pain, shortness of breath, abdominal pain, urinary sx, bowel changes. Past medical, surgical, and social history reviewed and updated  Medications and allergies reviewed and updated      O: /82   Pulse 96   Temp 97.8 °F (36.6 °C) (Temporal)   Resp 12   Wt 175 lb 4.8 oz (79.5 kg)   SpO2 99%   BMI 26.65 kg/m²   GEN: No acute distress, cooperative, well nourished, alert. HEENT: PEERLA, EOMI , normocephalic/atraumatic, nares and oropharynx clear. Mucous membranes normal, Tympanic membranes clear bilaterally. Neck: soft, supple, no thyromegaly, mass, no Lymphadenopathy  CV: Regular rate and rhythm, no murmur, rubs, gallops. No edema. Resp: Clear to auscultation bilaterally good air entry bilaterally  No crackles, wheeze. Breathing comfortably. Psych: mood stable, No suicidal thoughts or ideation   Abd: soft, nontender. normoactive bowels sounds.   No hepatosplenomegaly  no costovertebral angle tenderness  no mass          Current Outpatient Medications   Medication Sig Dispense Refill    pantoprazole (PROTONIX) 40 MG tablet Take 1 tablet by mouth in the morning. 30 tablet 5    LORazepam (ATIVAN) 0.5 MG tablet TAKE 1 TABLET BY MOUTH EVERY 8 HOURS AS NEEDED FOR ANXIETY 90 tablet 2    QUEtiapine (SEROQUEL) 25 MG tablet Take 1-2 tablets by mouth nightly 60 tablet 5    lisinopril (PRINIVIL;ZESTRIL) 20 MG tablet TAKE 1 TABLET BY MOUTH DAILY 90 tablet 3     No current facility-administered medications for this visit. ASSESSMENT / PLAN:    1. Dyspepsia  Stable with prn protonix  Exam nonfocal  F/u increased/breakthru sx  - pantoprazole (PROTONIX) 40 MG tablet; Take 1 tablet by mouth in the morning. Dispense: 30 tablet; Refill: 5    2. Anxiety  Stable w/o flare  Stressors have improved, still with some persistent breakthru sx  Discussed tx options. Refer counseling/therpay  Cont prn ativan, use intermittent  See [facility]  Pt aware of need for every 3 month medication followup appointments, and that medication refills for benzodiazepines, narcotics and/or stimulants will only be given at appointment.   - LORazepam (ATIVAN) 0.5 MG tablet; TAKE 1 TABLET BY MOUTH EVERY 8 HOURS AS NEEDED FOR ANXIETY  Dispense: 90 tablet; Refill: 2  - External Referral To Psychology    3. Essential hypertension  Stable, recheck normal    4. Screen for colon cancer  Did have colonoscopy a few months ago, will call for results from dr. Jackie Driver    5. Primary insomnia  Resume seroquel 25mg qhs  Discussed use, benefit, and side effects of prescribed medications. Barriers to medication compliance addressed. All patient questions answered. Pt voiced understanding. 6. Gastroesophageal reflux disease without esophagitis  stable    7. Stress  Refer psychology as above  - External Referral To Psychology    8.  Acute diverticulitis  Resolved w/o persistent sx  Tx constipation as below  Consider referral to general surgery for discussion of preventative partial colectomy for recurrent or increased sx  Pt aware of risk of recurrent flares    9. Diverticular disease of colon  As above    10. Chronic constipation  Trial change of meds to miralax 17gm over the counter QD  Consider linzess  F/u with GI increased sx    11. Need for shingles vaccine  Given # 1 today           Follow-up appointment:   Call or return to clinic prn if these symptoms worsen or fail to improve as anticipated. Discussed use, benefit, and side effects of all prescribed medications. Barriers to medication compliance addressed. All patient questions answered. Pt voiced understanding. When applicable, patient's outside records were reviewed through [facility]. The patient has signed appropriate paperworks/consents.

## 2022-08-24 ENCOUNTER — TELEMEDICINE (OUTPATIENT)
Dept: FAMILY MEDICINE CLINIC | Age: 52
End: 2022-08-24
Payer: COMMERCIAL

## 2022-08-24 DIAGNOSIS — J01.90 ACUTE SINUSITIS, RECURRENCE NOT SPECIFIED, UNSPECIFIED LOCATION: Primary | ICD-10-CM

## 2022-08-24 DIAGNOSIS — Z86.16 HISTORY OF COVID-19: ICD-10-CM

## 2022-08-24 PROCEDURE — G8427 DOCREV CUR MEDS BY ELIG CLIN: HCPCS | Performed by: FAMILY MEDICINE

## 2022-08-24 PROCEDURE — 99213 OFFICE O/P EST LOW 20 MIN: CPT | Performed by: FAMILY MEDICINE

## 2022-08-24 PROCEDURE — 3017F COLORECTAL CA SCREEN DOC REV: CPT | Performed by: FAMILY MEDICINE

## 2022-08-24 RX ORDER — AMOXICILLIN AND CLAVULANATE POTASSIUM 875; 125 MG/1; MG/1
1 TABLET, FILM COATED ORAL 2 TIMES DAILY
Qty: 20 TABLET | Refills: 0 | Status: SHIPPED | OUTPATIENT
Start: 2022-08-24 | End: 2022-09-03

## 2022-08-24 NOTE — PROGRESS NOTES
2022    TELEHEALTH EVALUATION -- Audio/Visual (During RPXZR-88 public health emergency)    Due to Cheryl 19 outbreak, patient's office visit was converted to a virtual visit. Patient was contacted and agreed to proceed with a virtual visit via [email or web address]  The risks and benefits of converting to a virtual visit were discussed in light of the current infectious disease epidemic. Patient also understood that insurance coverage and co-pays are up to their individual insurance plans. HPI:    Patrick Huitron (:  1970) has requested an audio/video evaluation for the following concern(s):    Fever. He had COVID 2 weeks ago . Last week he felt better , but had some mild headaches , fatigue and foggy brain. Saturday and !00.5 fever started on  am..  chills and sweats. Right sided headache and right ear pressure. No nasal congestion . post nasal drip . Irritated throat. No shortness of breath or wheezing. Dry cough still. Sleeping fluctuates. Cough is not worsening. Slept okay last night. Intermittent low grade fever. Travelling on  to [place]. Review of Systems    Prior to Visit Medications    Medication Sig Taking? Authorizing Provider   pantoprazole (PROTONIX) 40 MG tablet Take 1 tablet by mouth in the morning.  Yes Hiram Seals MD   LORazepam (ATIVAN) 0.5 MG tablet TAKE 1 TABLET BY MOUTH EVERY 8 HOURS AS NEEDED FOR ANXIETY Yes Hiram Seals MD   QUEtiapine (SEROQUEL) 25 MG tablet Take 1-2 tablets by mouth nightly Yes Hiram Seals MD   lisinopril (PRINIVIL;ZESTRIL) 20 MG tablet TAKE 1 TABLET BY MOUTH DAILY Yes Hiram Seals MD       Allergies   Allergen Reactions    Levaquin [Levofloxacin]      Malaise         Social History     Tobacco Use    Smoking status: Never    Smokeless tobacco: Never   Substance Use Topics    Alcohol use: Yes     Comment: social     Drug use: Never        Past Medical History:   Diagnosis Date    Anxiety     BPH (benign prostatic hyperplasia) 4/4/2013    Coronary artery calcification     Hypercholesterolemia     Nephrolithiasis 2000    Seasonal allergic rhinitis 7/22/2019       History reviewed. No pertinent surgical history. Health Maintenance   Topic Date Due    Colorectal Cancer Screen  Never done    COVID-19 Vaccine (4 - Booster for Moderna series) 04/13/2022    Flu vaccine (1) 09/01/2022    Shingles vaccine (2 of 2) 09/20/2022    DTaP/Tdap/Td vaccine (2 - Td or Tdap) 04/04/2023    Depression Screen  07/26/2023    Lipids  11/19/2026    Hepatitis C screen  Completed    Hepatitis A vaccine  Aged Out    Hepatitis B vaccine  Aged Out    Hib vaccine  Aged Out    Meningococcal (ACWY) vaccine  Aged Out    Pneumococcal 0-64 years Vaccine  Aged Out    HIV screen  Discontinued       Family History   Problem Relation Age of Onset    Diabetes Brother         type 1     Hypertension Mother     Breast Cancer Mother 61    Hypertension Father     Diabetes Father     Coronary Art Dis Maternal Uncle        PHYSICAL EXAMINATION:    Vital Signs: (As obtained by patient/caregiver or practitioner observation)     Patient-Reported Vitals 8/24/2022   Patient-Reported Weight 175lb   Patient-Reported Height -   Patient-Reported Temperature -         Heart rate= 80  Respiratory rate= 14 Temperature= 99      Constitutional:  Appears well-developed and well-nourished. No apparent distress                              Mental status:  Alert and awake. Oriented to person/place/time. Able to follow commands       Eyes: EOM intact. Sclera-normal. No erythema of conjunctiva. No eye discharge. HENT: Normocephalic, atraumatic. Mouth/Throat: normal. Mucous membranes are moist.      External Ears: Normal       Neck: No visualized mass      Pulmonary/Chest:  Respiratory effort normal.  No visualized signs of difficulty breathing or respiratory distress         Musculoskeletal:   Normal gait with no signs of ataxia. Normal range of motion of neck. Neurological:         No Facial Asymmetry (Cranial nerve 7 motor function) (limited exam to video visit) . No gaze palsy              Skin:                     No significant exanthematous lesions or discoloration noted on facial skin                                        Psychiatric:          Normal Affect. No Hallucinations           Other pertinent observable physical exam findings:       ASSESSMENT/PLAN:  1. Acute sinusitis, recurrence not specified, unspecified location  - Push fluids - water. Netti pot prn.    - amoxicillin-clavulanate (AUGMENTIN) 875-125 MG per tablet; Take 1 tablet by mouth 2 times daily for 10 days  Dispense: 20 tablet; Refill: 0    2. History of COVID-19  - likely #1 is secondary effect from COVID. F/u if no improvement 7-10d/ prn increased symptoms. An  electronic signature was used to authenticate this note. --Marian Sen MD on 8/24/2022 at 10:12 AM      Pursuant to the emergency declaration under the [facility], [facility] waiver authority and the [employer] Act, this Virtual  Visit was conducted, with patient's consent, to reduce the patient's risk of exposure to COVID-19 and provide continuity of care for an established patient. Services were provided through a video synchronous discussion virtually to substitute for in-person clinic visit.

## 2022-12-13 ENCOUNTER — OFFICE VISIT (OUTPATIENT)
Dept: FAMILY MEDICINE CLINIC | Age: 52
End: 2022-12-13
Payer: COMMERCIAL

## 2022-12-13 VITALS
DIASTOLIC BLOOD PRESSURE: 86 MMHG | SYSTOLIC BLOOD PRESSURE: 134 MMHG | RESPIRATION RATE: 14 BRPM | BODY MASS INDEX: 27.4 KG/M2 | WEIGHT: 185 LBS | HEIGHT: 69 IN | HEART RATE: 87 BPM | TEMPERATURE: 98.3 F | OXYGEN SATURATION: 97 %

## 2022-12-13 DIAGNOSIS — I25.10 CORONARY ARTERY CALCIFICATION: ICD-10-CM

## 2022-12-13 DIAGNOSIS — Z00.00 ROUTINE GENERAL MEDICAL EXAMINATION AT A HEALTH CARE FACILITY: Primary | ICD-10-CM

## 2022-12-13 DIAGNOSIS — N40.1 BENIGN PROSTATIC HYPERPLASIA WITH URINARY FREQUENCY: ICD-10-CM

## 2022-12-13 DIAGNOSIS — F41.9 ANXIETY: ICD-10-CM

## 2022-12-13 DIAGNOSIS — R35.0 BENIGN PROSTATIC HYPERPLASIA WITH URINARY FREQUENCY: ICD-10-CM

## 2022-12-13 DIAGNOSIS — J01.90 ACUTE SINUSITIS, RECURRENCE NOT SPECIFIED, UNSPECIFIED LOCATION: ICD-10-CM

## 2022-12-13 DIAGNOSIS — I25.84 CORONARY ARTERY CALCIFICATION: ICD-10-CM

## 2022-12-13 DIAGNOSIS — J30.2 SEASONAL ALLERGIC RHINITIS, UNSPECIFIED TRIGGER: ICD-10-CM

## 2022-12-13 DIAGNOSIS — J32.9 RECURRENT SINUS INFECTIONS: ICD-10-CM

## 2022-12-13 DIAGNOSIS — I10 ESSENTIAL HYPERTENSION: ICD-10-CM

## 2022-12-13 DIAGNOSIS — E78.00 HYPERCHOLESTEROLEMIA: ICD-10-CM

## 2022-12-13 DIAGNOSIS — Z12.11 SCREEN FOR COLON CANCER: ICD-10-CM

## 2022-12-13 DIAGNOSIS — F51.01 PRIMARY INSOMNIA: ICD-10-CM

## 2022-12-13 DIAGNOSIS — R10.9 RIGHT FLANK PAIN: ICD-10-CM

## 2022-12-13 DIAGNOSIS — Z23 NEED FOR SHINGLES VACCINE: ICD-10-CM

## 2022-12-13 PROCEDURE — 90750 HZV VACC RECOMBINANT IM: CPT | Performed by: FAMILY MEDICINE

## 2022-12-13 PROCEDURE — G8484 FLU IMMUNIZE NO ADMIN: HCPCS | Performed by: FAMILY MEDICINE

## 2022-12-13 PROCEDURE — 90471 IMMUNIZATION ADMIN: CPT | Performed by: FAMILY MEDICINE

## 2022-12-13 PROCEDURE — 3078F DIAST BP <80 MM HG: CPT | Performed by: FAMILY MEDICINE

## 2022-12-13 PROCEDURE — 3074F SYST BP LT 130 MM HG: CPT | Performed by: FAMILY MEDICINE

## 2022-12-13 PROCEDURE — 99396 PREV VISIT EST AGE 40-64: CPT | Performed by: FAMILY MEDICINE

## 2022-12-13 RX ORDER — LISINOPRIL 20 MG/1
20 TABLET ORAL DAILY
Qty: 90 TABLET | Refills: 3 | Status: SHIPPED | OUTPATIENT
Start: 2022-12-13

## 2022-12-13 RX ORDER — QUETIAPINE FUMARATE 25 MG/1
25-50 TABLET, FILM COATED ORAL NIGHTLY
Qty: 60 TABLET | Refills: 5 | Status: SHIPPED | OUTPATIENT
Start: 2022-12-13

## 2022-12-13 SDOH — ECONOMIC STABILITY: FOOD INSECURITY: WITHIN THE PAST 12 MONTHS, YOU WORRIED THAT YOUR FOOD WOULD RUN OUT BEFORE YOU GOT MONEY TO BUY MORE.: NEVER TRUE

## 2022-12-13 SDOH — ECONOMIC STABILITY: TRANSPORTATION INSECURITY
IN THE PAST 12 MONTHS, HAS THE LACK OF TRANSPORTATION KEPT YOU FROM MEDICAL APPOINTMENTS OR FROM GETTING MEDICATIONS?: NO

## 2022-12-13 SDOH — ECONOMIC STABILITY: TRANSPORTATION INSECURITY
IN THE PAST 12 MONTHS, HAS LACK OF TRANSPORTATION KEPT YOU FROM MEETINGS, WORK, OR FROM GETTING THINGS NEEDED FOR DAILY LIVING?: NO

## 2022-12-13 SDOH — ECONOMIC STABILITY: FOOD INSECURITY: WITHIN THE PAST 12 MONTHS, THE FOOD YOU BOUGHT JUST DIDN'T LAST AND YOU DIDN'T HAVE MONEY TO GET MORE.: NEVER TRUE

## 2022-12-13 ASSESSMENT — SOCIAL DETERMINANTS OF HEALTH (SDOH): HOW HARD IS IT FOR YOU TO PAY FOR THE VERY BASICS LIKE FOOD, HOUSING, MEDICAL CARE, AND HEATING?: NOT HARD AT ALL

## 2022-12-13 NOTE — PROGRESS NOTES
Here for well checkup, physical    Pt states that things are doing well, pt states that they are in the process of buying a house in [place]. Will keep duplex in [place] and rent that out, and then will work on fixing up the house. Pt does enjoy the work, does enjoy doing renovations. Pt is feeling well overall, does not have any new issues or concerns overall. Pt did have COVID over the summer, and did get better. A few weeks after having covid, had some lingering URI sx and then progressed to a moderate/significant sinus infection. Pt did virtual visit and sx have improved with abx. Pt does continue to have some issues with persistent sx of nasal congestion. Pt did try some over the counter claritin with mild relief. Pt here for follow up of blood pressure. Pt states doing great with adherence to therapy and feels well. No issues of chest pain, shortness of breath. No vision changes, headache, swelling in legs. Here for f/u of cholesterol. Pt as been working on diet/exercise and is consistent with therapy. No side effects from current therapy. No chest pain, shortness of breath. No numbness/tingling in extremities     Pt is not consistent with exercise, not eating great but not eating poorly. Pt does eat poorly at times, but does try and make good food choices/habits. Pt drinks a few nights a week, does drink craft beer. Does hit harder on weekend. Will drink up to 6 beers/night.   Stress level is ok        Except as noted in the history of present illness as above, the review of systems is negative for the following:    General ROS: negative  Psychological ROS: negative  Allergy and Immunology ROS: negative  Hematological and Lymphatic ROS: negative  Respiratory ROS: no cough, shortness of breath, or wheezing  Cardiovascular ROS: no chest pain or dyspnea on exertion  Gastrointestinal ROS: no abdominal pain, change in bowel habits, or black or bloody stools  Genito-Urinary ROS: no dysuria, trouble voiding, or hematuria  Musculoskeletal ROS: negative  Dermatological ROS: negative      Past medical, surgical, and social history reviewed and updated. Medications and allergies reviewed and updated        /86   Pulse 87   Temp 98.3 °F (36.8 °C) (Temporal)   Resp 14   Ht 5' 9\" (1.753 m)   Wt 185 lb (83.9 kg)   SpO2 97%   BMI 27.32 kg/m²   General appearance - healthy, alert, no distress  Skin - Skin color, texture, turgor normal. No rashes or lesions. No suspicious findings  Head - Normocephalic. No masses, lesions, tenderness or abnormalities  Eyes - conjunctivae/corneas clear. Pupils equal and reactive to light and accomodation, extraocular muscles intact. Moderate nasal congestion bilaterally  Ears - External ears normal. Canals clear. Tympanic membranes normal bilaterally. Nose/Sinuses - Nares normal. Septum midline. Mucosa normal. No drainage or sinus tenderness. Oropharynx - Lips, mucosa, and tongue normal. Teeth and gums normal.   Neck - Neck supple. No adenopathy. Thyroid symmetric, normal size, no carotid bruit bilaterally  Back - Back symmetric, no curvature. Range of motion normal. No Costovertebral angle tenderness. Lungs - Percussion normal. Good diaphragmatic excursion. Lungs clear without wheeze, rales, crackles  Heart - Regular rate and rhythm, with no rub, murmur or gallop noted. Abdomen - Abdomen soft, non-tender. Bowel sounds normal. No masses, tenderness or organomegaly  Extremities - Extremities normal. No deformities, edema, or skin discoloration  Musculoskeletal - Spine ROM normal. Muscular strength intact. Peripheral pulses - radial=4/4,, femoral=4/4, popliteal=4/4, dorsalis pedis=4/4,  Neuro - Gait normal. Reflexes normal and symmetric. Sensation grossly normal.  No focal weakness  Psych - euthymic, no suicidal thoughts or ideation, mood stable.        Current Outpatient Medications   Medication Sig Dispense Refill    QUEtiapine (SEROQUEL) 25 MG tablet Take 1-2 tablets by mouth nightly 60 tablet 5    lisinopril (PRINIVIL;ZESTRIL) 20 MG tablet Take 1 tablet by mouth daily 90 tablet 3    pantoprazole (PROTONIX) 40 MG tablet Take 1 tablet by mouth in the morning. 30 tablet 5     No current facility-administered medications for this visit. ASSESSMENT / PLAN:    1. Routine general medical examination at a health care facility  No focal abnormalities on exam  Anticipatory guidance discussed. Check bloodwork as below  - CBC; Future  - Comprehensive Metabolic Panel; Future  - Lipid Panel; Future  - PSA, Prostatic Specific Antigen; Future  - Hemoglobin A1C; Future  - TSH; Future    2. Essential hypertension  Stable @ goal, controlled  Cont ACE therapy  Check bloodwork as below  - lisinopril (PRINIVIL;ZESTRIL) 20 MG tablet; Take 1 tablet by mouth daily  Dispense: 90 tablet; Refill: 3  - CBC; Future  - Comprehensive Metabolic Panel; Future  - Lipid Panel; Future    3. Coronary artery calcification  Asymptomatic  monitor    4. Screen for colon cancer  Did have done, will call for copy of colonoscopy     5. Hypercholesterolemia    - Lipid Panel; Future  - TSH; Future    6. Benign prostatic hyperplasia with urinary frequency  Stable w/o progressive sx  Cont supportive therapy  Check psa today  - PSA, Prostatic Specific Antigen; Future    7. Need for shingles vaccine  Given # 2 shingrix today  - Zoster, SHINGRIX, (18 yrs +), IM    8. Anxiety  Stable w/o flare    9. Seasonal allergic rhinitis, unspecified trigger  Cont suppportive over the counter therapy  Check CT sinuses    10. Acute sinusitis, recurrence not specified, unspecified location  Resolved s/p abx  Cont supportive therapy  Check CT d/t recurrent sinus issues    11. Recurrent sinus infections  - CT SINUS WO CONTRAST; Future    12. Primary insomnia  - QUEtiapine (SEROQUEL) 25 MG tablet; Take 1-2 tablets by mouth nightly  Dispense: 60 tablet; Refill: 5    13.  Right flank pain  Resolved  Asymptomatic  monitor           Follow-up appointment:   Pending bloodwork  Pending CT sinuses  Prn     Discussed use, benefit, and side effects of all prescribed medications. Barriers to medication compliance addressed. All patient questions answered. Pt voiced understanding. When applicable, patient's outside records were reviewed through [facility]. The patient has signed appropriate paperworks/consents.

## 2022-12-15 DIAGNOSIS — R35.0 BENIGN PROSTATIC HYPERPLASIA WITH URINARY FREQUENCY: ICD-10-CM

## 2022-12-15 DIAGNOSIS — Z00.00 ROUTINE GENERAL MEDICAL EXAMINATION AT A HEALTH CARE FACILITY: ICD-10-CM

## 2022-12-15 DIAGNOSIS — I10 ESSENTIAL HYPERTENSION: ICD-10-CM

## 2022-12-15 DIAGNOSIS — N40.1 BENIGN PROSTATIC HYPERPLASIA WITH URINARY FREQUENCY: ICD-10-CM

## 2022-12-15 DIAGNOSIS — E78.00 HYPERCHOLESTEROLEMIA: ICD-10-CM

## 2022-12-15 LAB
A/G RATIO: 1.6 (ref 1.1–2.2)
ALBUMIN SERPL-MCNC: 4.2 G/DL (ref 3.4–5)
ALP BLD-CCNC: 56 U/L (ref 40–129)
ALT SERPL-CCNC: 38 U/L (ref 10–40)
ANION GAP SERPL CALCULATED.3IONS-SCNC: 11 MMOL/L (ref 3–16)
AST SERPL-CCNC: 33 U/L (ref 15–37)
BILIRUB SERPL-MCNC: 0.5 MG/DL (ref 0–1)
BUN BLDV-MCNC: 13 MG/DL (ref 7–20)
CALCIUM SERPL-MCNC: 9.8 MG/DL (ref 8.3–10.6)
CHLORIDE BLD-SCNC: 99 MMOL/L (ref 99–110)
CHOLESTEROL, TOTAL: 235 MG/DL (ref 0–199)
CO2: 26 MMOL/L (ref 21–32)
CREAT SERPL-MCNC: 1 MG/DL (ref 0.9–1.3)
GFR SERPL CREATININE-BSD FRML MDRD: >60 ML/MIN/{1.73_M2}
GLUCOSE BLD-MCNC: 111 MG/DL (ref 70–99)
HCT VFR BLD CALC: 43.6 % (ref 40.5–52.5)
HDLC SERPL-MCNC: 48 MG/DL (ref 40–60)
HEMOGLOBIN: 14.6 G/DL (ref 13.5–17.5)
LDL CHOLESTEROL CALCULATED: ABNORMAL MG/DL
LDL CHOLESTEROL DIRECT: 152 MG/DL
MCH RBC QN AUTO: 32 PG (ref 26–34)
MCHC RBC AUTO-ENTMCNC: 33.5 G/DL (ref 31–36)
MCV RBC AUTO: 95.7 FL (ref 80–100)
PDW BLD-RTO: 12.4 % (ref 12.4–15.4)
PLATELET # BLD: 204 K/UL (ref 135–450)
PMV BLD AUTO: 7.9 FL (ref 5–10.5)
POTASSIUM SERPL-SCNC: 5 MMOL/L (ref 3.5–5.1)
PROSTATE SPECIFIC ANTIGEN: 2.83 NG/ML (ref 0–4)
RBC # BLD: 4.55 M/UL (ref 4.2–5.9)
SODIUM BLD-SCNC: 136 MMOL/L (ref 136–145)
TOTAL PROTEIN: 6.9 G/DL (ref 6.4–8.2)
TRIGL SERPL-MCNC: 313 MG/DL (ref 0–150)
TSH SERPL DL<=0.05 MIU/L-ACNC: 0.86 UIU/ML (ref 0.27–4.2)
VLDLC SERPL CALC-MCNC: ABNORMAL MG/DL
WBC # BLD: 3.4 K/UL (ref 4–11)

## 2022-12-16 LAB
ESTIMATED AVERAGE GLUCOSE: 108.3 MG/DL
HBA1C MFR BLD: 5.4 %

## 2022-12-18 DIAGNOSIS — D72.819 LEUKOPENIA, UNSPECIFIED TYPE: Primary | ICD-10-CM

## 2023-12-03 DIAGNOSIS — F51.01 PRIMARY INSOMNIA: ICD-10-CM

## 2023-12-03 RX ORDER — QUETIAPINE FUMARATE 25 MG/1
TABLET, FILM COATED ORAL
Qty: 60 TABLET | Refills: 5 | Status: SHIPPED | OUTPATIENT
Start: 2023-12-03

## 2024-01-01 DIAGNOSIS — I10 ESSENTIAL HYPERTENSION: ICD-10-CM

## 2024-01-01 RX ORDER — LISINOPRIL 20 MG/1
20 TABLET ORAL DAILY
Qty: 90 TABLET | Refills: 3 | Status: SHIPPED | OUTPATIENT
Start: 2024-01-01

## 2024-01-31 ENCOUNTER — TELEPHONE (OUTPATIENT)
Dept: FAMILY MEDICINE CLINIC | Age: 54
End: 2024-01-31

## 2024-01-31 NOTE — TELEPHONE ENCOUNTER
Pain and swelling in throat and lymph nodes  No difficulty swallowing  Slight cough  Sweats, no chills, no fever, no congestion  Only wants to see Dr Griffin  LOV: 12/13/22

## 2024-02-01 ENCOUNTER — OFFICE VISIT (OUTPATIENT)
Dept: FAMILY MEDICINE CLINIC | Age: 54
End: 2024-02-01

## 2024-02-01 VITALS
TEMPERATURE: 96.9 F | SYSTOLIC BLOOD PRESSURE: 110 MMHG | HEIGHT: 70 IN | WEIGHT: 175 LBS | DIASTOLIC BLOOD PRESSURE: 80 MMHG | OXYGEN SATURATION: 95 % | BODY MASS INDEX: 25.05 KG/M2 | HEART RATE: 100 BPM

## 2024-02-01 DIAGNOSIS — J01.90 ACUTE BACTERIAL SINUSITIS: ICD-10-CM

## 2024-02-01 DIAGNOSIS — B96.89 ACUTE BACTERIAL SINUSITIS: ICD-10-CM

## 2024-02-01 DIAGNOSIS — R30.0 DYSURIA: Primary | ICD-10-CM

## 2024-02-01 LAB
BILIRUBIN, POC: NORMAL
BLOOD URINE, POC: NORMAL
CLARITY, POC: CLEAR
COLOR, POC: YELLOW
GLUCOSE URINE, POC: NORMAL
INFLUENZA A ANTIBODY: NORMAL
INFLUENZA B ANTIBODY: NORMAL
KETONES, POC: NORMAL
LEUKOCYTE EST, POC: NORMAL
NITRITE, POC: NORMAL
PH, POC: 5.5
PROTEIN, POC: NORMAL
RSV RNA: NORMAL
SPECIFIC GRAVITY, POC: 1.03
UROBILINOGEN, POC: 0.2

## 2024-02-01 PROCEDURE — 99214 OFFICE O/P EST MOD 30 MIN: CPT | Performed by: NURSE PRACTITIONER

## 2024-02-01 PROCEDURE — 81002 URINALYSIS NONAUTO W/O SCOPE: CPT | Performed by: NURSE PRACTITIONER

## 2024-02-01 PROCEDURE — 87804 INFLUENZA ASSAY W/OPTIC: CPT | Performed by: NURSE PRACTITIONER

## 2024-02-01 PROCEDURE — 87634 RSV DNA/RNA AMP PROBE: CPT | Performed by: NURSE PRACTITIONER

## 2024-02-01 PROCEDURE — 3079F DIAST BP 80-89 MM HG: CPT | Performed by: NURSE PRACTITIONER

## 2024-02-01 PROCEDURE — 3074F SYST BP LT 130 MM HG: CPT | Performed by: NURSE PRACTITIONER

## 2024-02-01 RX ORDER — AMOXICILLIN AND CLAVULANATE POTASSIUM 875; 125 MG/1; MG/1
1 TABLET, FILM COATED ORAL 2 TIMES DAILY
Qty: 14 TABLET | Refills: 0 | Status: SHIPPED | OUTPATIENT
Start: 2024-02-01 | End: 2024-02-08

## 2024-02-01 SDOH — ECONOMIC STABILITY: INCOME INSECURITY: HOW HARD IS IT FOR YOU TO PAY FOR THE VERY BASICS LIKE FOOD, HOUSING, MEDICAL CARE, AND HEATING?: NOT HARD AT ALL

## 2024-02-01 SDOH — ECONOMIC STABILITY: FOOD INSECURITY: WITHIN THE PAST 12 MONTHS, THE FOOD YOU BOUGHT JUST DIDN'T LAST AND YOU DIDN'T HAVE MONEY TO GET MORE.: NEVER TRUE

## 2024-02-01 SDOH — ECONOMIC STABILITY: FOOD INSECURITY: WITHIN THE PAST 12 MONTHS, YOU WORRIED THAT YOUR FOOD WOULD RUN OUT BEFORE YOU GOT MONEY TO BUY MORE.: NEVER TRUE

## 2024-02-01 SDOH — ECONOMIC STABILITY: HOUSING INSECURITY
IN THE LAST 12 MONTHS, WAS THERE A TIME WHEN YOU DID NOT HAVE A STEADY PLACE TO SLEEP OR SLEPT IN A SHELTER (INCLUDING NOW)?: NO

## 2024-02-01 ASSESSMENT — ENCOUNTER SYMPTOMS
SINUS PRESSURE: 0
DIARRHEA: 0
WHEEZING: 0
CHEST TIGHTNESS: 0
ABDOMINAL PAIN: 0
CONSTIPATION: 0
COLOR CHANGE: 0
NAUSEA: 0
RHINORRHEA: 0
EYE ITCHING: 0
SORE THROAT: 1
COUGH: 0
VOMITING: 0
SHORTNESS OF BREATH: 0
BACK PAIN: 1
EYE REDNESS: 0
BLOOD IN STOOL: 0

## 2024-02-01 ASSESSMENT — PATIENT HEALTH QUESTIONNAIRE - PHQ9
SUM OF ALL RESPONSES TO PHQ QUESTIONS 1-9: 0
SUM OF ALL RESPONSES TO PHQ QUESTIONS 1-9: 0
SUM OF ALL RESPONSES TO PHQ9 QUESTIONS 1 & 2: 0
2. FEELING DOWN, DEPRESSED OR HOPELESS: 0
SUM OF ALL RESPONSES TO PHQ QUESTIONS 1-9: 0
SUM OF ALL RESPONSES TO PHQ QUESTIONS 1-9: 0
1. LITTLE INTEREST OR PLEASURE IN DOING THINGS: 0

## 2024-02-01 NOTE — ASSESSMENT & PLAN NOTE
Flu and covid negative. Will start patient on Augmentin. Continue with symptomatic management, increased water intake, saline irrigation, as well as mucolytics and antihistamines as needed. Patient advised to call back if no improvement.

## 2024-02-01 NOTE — PROGRESS NOTES
Guillaume Vance (:  1970) is a 53 y.o. male,Established patient, here for evaluation of the following chief complaint(s):  Pharyngitis and Chest Congestion (X 1 week)      ASSESSMENT/PLAN:  1. Dysuria  Assessment & Plan:   Ua in office negative  Push fluids    Orders:  -     POCT Urinalysis no Micro  2. Acute bacterial sinusitis  Assessment & Plan:   Flu and covid negative. Will start patient on Augmentin. Continue with symptomatic management, increased water intake, saline irrigation, as well as mucolytics and antihistamines as needed. Patient advised to call back if no improvement.     Orders:  -     POCT Influenza A/B  -     POCT RSV Molecular (drops CPT 33840)      No follow-ups on file.    SUBJECTIVE/OBJECTIVE:    Started a week ago with discomfort in throat and lymph nodes. Reports headaches in the posterior aspect of his head. Was not really having sinus pain ad pressure. States that there is pain, but more just discomfort. Got to the point yesterday started flushed and feversih, so he decided to call to be seen. Progressing over the past 24 hours. Hest feelsheavy, and having some lower back pain and flank pain. He is now coughing, not short of breath, no chest pain. This morning trouble urinating. This morning on two occasions, noticed decreased urine output. Noticing dribbling. Having some lower pelvic pain.   Current Outpatient Medications   Medication Sig Dispense Refill    amoxicillin-clavulanate (AUGMENTIN) 875-125 MG per tablet Take 1 tablet by mouth 2 times daily for 7 days 14 tablet 0    lisinopril (PRINIVIL;ZESTRIL) 20 MG tablet TAKE 1 TABLET BY MOUTH DAILY 90 tablet 3    QUEtiapine (SEROQUEL) 25 MG tablet TAKE 1 TO 2 TABLETS BY MOUTH EVERY NIGHT 60 tablet 5    pantoprazole (PROTONIX) 40 MG tablet Take 1 tablet by mouth in the morning. (Patient not taking: Reported on 2024) 30 tablet 5     No current facility-administered medications for this visit.       Review of Systems   Constitutional:

## 2024-07-25 DIAGNOSIS — F51.01 PRIMARY INSOMNIA: ICD-10-CM

## 2024-07-26 RX ORDER — QUETIAPINE FUMARATE 25 MG/1
TABLET, FILM COATED ORAL
Qty: 60 TABLET | Refills: 5 | Status: SHIPPED | OUTPATIENT
Start: 2024-07-26

## 2024-12-20 ENCOUNTER — PATIENT MESSAGE (OUTPATIENT)
Dept: FAMILY MEDICINE CLINIC | Age: 54
End: 2024-12-20

## 2024-12-20 DIAGNOSIS — I10 ESSENTIAL HYPERTENSION: ICD-10-CM

## 2024-12-20 RX ORDER — LISINOPRIL 20 MG/1
20 TABLET ORAL DAILY
Qty: 30 TABLET | Refills: 0 | OUTPATIENT
Start: 2024-12-20

## 2024-12-20 NOTE — TELEPHONE ENCOUNTER
Medication:   Requested Prescriptions     Pending Prescriptions Disp Refills    lisinopril (PRINIVIL;ZESTRIL) 20 MG tablet 30 tablet 0     Sig: Take 1 tablet by mouth daily     Last Filled:      Last appt: 2/1/2024   Next appt: Visit date not found    Last OARRS:       7/26/2022     1:51 PM   RX Monitoring   Periodic Controlled Substance Monitoring Possible medication side effects, risk of tolerance/dependence & alternative treatments discussed.;No signs of potential drug abuse or diversion identified.;Assessed functional status.

## 2024-12-22 RX ORDER — LISINOPRIL 20 MG/1
20 TABLET ORAL DAILY
Qty: 30 TABLET | Refills: 0 | Status: SHIPPED | OUTPATIENT
Start: 2024-12-22

## 2025-01-13 ENCOUNTER — PATIENT MESSAGE (OUTPATIENT)
Dept: FAMILY MEDICINE CLINIC | Age: 55
End: 2025-01-13

## 2025-01-13 DIAGNOSIS — I10 ESSENTIAL HYPERTENSION: ICD-10-CM

## 2025-01-13 RX ORDER — LISINOPRIL 20 MG/1
20 TABLET ORAL DAILY
Qty: 30 TABLET | Refills: 0 | Status: SHIPPED | OUTPATIENT
Start: 2025-01-13

## 2025-02-04 ENCOUNTER — OFFICE VISIT (OUTPATIENT)
Dept: FAMILY MEDICINE CLINIC | Age: 55
End: 2025-02-04

## 2025-02-04 VITALS
RESPIRATION RATE: 16 BRPM | BODY MASS INDEX: 24.05 KG/M2 | TEMPERATURE: 96.6 F | HEART RATE: 73 BPM | SYSTOLIC BLOOD PRESSURE: 130 MMHG | DIASTOLIC BLOOD PRESSURE: 80 MMHG | WEIGHT: 168 LBS | HEIGHT: 70 IN | OXYGEN SATURATION: 99 %

## 2025-02-04 DIAGNOSIS — E78.00 HYPERCHOLESTEROLEMIA: ICD-10-CM

## 2025-02-04 DIAGNOSIS — J01.90 ACUTE BACTERIAL SINUSITIS: ICD-10-CM

## 2025-02-04 DIAGNOSIS — N40.1 BENIGN PROSTATIC HYPERPLASIA WITH URINARY FREQUENCY: ICD-10-CM

## 2025-02-04 DIAGNOSIS — I25.10 CORONARY ARTERY CALCIFICATION: ICD-10-CM

## 2025-02-04 DIAGNOSIS — B96.89 ACUTE BACTERIAL SINUSITIS: ICD-10-CM

## 2025-02-04 DIAGNOSIS — J30.2 SEASONAL ALLERGIC RHINITIS, UNSPECIFIED TRIGGER: ICD-10-CM

## 2025-02-04 DIAGNOSIS — R35.0 BENIGN PROSTATIC HYPERPLASIA WITH URINARY FREQUENCY: ICD-10-CM

## 2025-02-04 DIAGNOSIS — F43.9 STRESS: ICD-10-CM

## 2025-02-04 DIAGNOSIS — F51.01 PRIMARY INSOMNIA: ICD-10-CM

## 2025-02-04 DIAGNOSIS — Z12.11 SCREEN FOR COLON CANCER: ICD-10-CM

## 2025-02-04 DIAGNOSIS — F41.9 ANXIETY: ICD-10-CM

## 2025-02-04 DIAGNOSIS — Z23 NEED FOR DIPHTHERIA-TETANUS-PERTUSSIS (TDAP) VACCINE: ICD-10-CM

## 2025-02-04 DIAGNOSIS — I10 ESSENTIAL HYPERTENSION: ICD-10-CM

## 2025-02-04 DIAGNOSIS — Z00.00 ROUTINE GENERAL MEDICAL EXAMINATION AT A HEALTH CARE FACILITY: Primary | ICD-10-CM

## 2025-02-04 PROBLEM — R30.0 DYSURIA: Status: RESOLVED | Noted: 2024-02-01 | Resolved: 2025-02-04

## 2025-02-04 RX ORDER — QUETIAPINE FUMARATE 25 MG/1
TABLET, FILM COATED ORAL
Qty: 60 TABLET | Refills: 5 | Status: SHIPPED | OUTPATIENT
Start: 2025-02-04

## 2025-02-04 RX ORDER — LISINOPRIL 20 MG/1
20 TABLET ORAL DAILY
Qty: 30 TABLET | Refills: 0 | Status: CANCELLED | OUTPATIENT
Start: 2025-02-04

## 2025-02-04 RX ORDER — LISINOPRIL 20 MG/1
20 TABLET ORAL DAILY
Qty: 90 TABLET | Refills: 3 | Status: SHIPPED | OUTPATIENT
Start: 2025-02-04

## 2025-02-04 RX ORDER — DOXYCYCLINE HYCLATE 100 MG
100 TABLET ORAL 2 TIMES DAILY
Qty: 20 TABLET | Refills: 0 | Status: SHIPPED | OUTPATIENT
Start: 2025-02-04 | End: 2025-02-14

## 2025-02-04 SDOH — ECONOMIC STABILITY: FOOD INSECURITY: WITHIN THE PAST 12 MONTHS, YOU WORRIED THAT YOUR FOOD WOULD RUN OUT BEFORE YOU GOT MONEY TO BUY MORE.: NEVER TRUE

## 2025-02-04 SDOH — ECONOMIC STABILITY: FOOD INSECURITY: WITHIN THE PAST 12 MONTHS, THE FOOD YOU BOUGHT JUST DIDN'T LAST AND YOU DIDN'T HAVE MONEY TO GET MORE.: NEVER TRUE

## 2025-02-04 ASSESSMENT — PATIENT HEALTH QUESTIONNAIRE - PHQ9
SUM OF ALL RESPONSES TO PHQ QUESTIONS 1-9: 0
SUM OF ALL RESPONSES TO PHQ QUESTIONS 1-9: 0
1. LITTLE INTEREST OR PLEASURE IN DOING THINGS: NOT AT ALL
SUM OF ALL RESPONSES TO PHQ9 QUESTIONS 1 & 2: 0
SUM OF ALL RESPONSES TO PHQ QUESTIONS 1-9: 0
2. FEELING DOWN, DEPRESSED OR HOPELESS: NOT AT ALL
SUM OF ALL RESPONSES TO PHQ QUESTIONS 1-9: 0

## 2025-02-04 NOTE — PROGRESS NOTES
Here for well checkup, physical.  Pt states that things are doing well, and work is doing ok overall.  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  Pt does travel with driving usually, depending on the size of the projects.  It can be all over the place, and was just in [place], [place] and is able to put in a little bit of fun in between.  Pt feels he is doing ok to stay active, is on his feet doing a lot of work, but not doing much in terms of formal exercise.  Pt continues to work on rehabbing a house, and they are finally able to live there and that is doing well.  Pt is doing overall well with lifestyle, is eating healthy overall.  At times can eat late and that is a challenge but he does try and eat healthy.  Often skips lunch.  Pt does tend to drink regulary but is working on minimizing    Pt has had some moderate stressors with a very close friend who committed suicide and that was very stressful, has been treating with drinking more EtOH.  Pt feels it is more under control.  Can drink between 2-6/day but has several days a week that he doesn't drink at all.  Pt is interested in counseling, but has not had     Pt has noted some BPH issues, with some issues of urgency.  Nocturia 1-2x    Pt has had some intermittent sinus issues and in the past has noted some URI sx, nasal congesation.  Sx have flared with a change of weather, especially with travel.  Pt has not been taking anything for allergies.  Sx for 3-4 weeks, did get started back on sudafed and nasal saline irrigation, which did help       Except as noted in the history of present illness as above, the review of systems is negative for the following:    General ROS: negative  Psychological ROS: negative  Allergy and Immunology ROS: negative  Hematological and Lymphatic ROS: negative  Respiratory ROS: no cough, shortness of breath, or wheezing  Cardiovascular ROS: no chest pain or dyspnea on

## 2025-02-17 DIAGNOSIS — E78.00 HYPERCHOLESTEROLEMIA: ICD-10-CM

## 2025-02-17 DIAGNOSIS — Z00.00 ROUTINE GENERAL MEDICAL EXAMINATION AT A HEALTH CARE FACILITY: ICD-10-CM

## 2025-02-17 LAB
ALBUMIN SERPL-MCNC: 4.5 G/DL (ref 3.4–5)
ALBUMIN/GLOB SERPL: 1.8 {RATIO} (ref 1.1–2.2)
ALP SERPL-CCNC: 54 U/L (ref 40–129)
ALT SERPL-CCNC: 55 U/L (ref 10–40)
ANION GAP SERPL CALCULATED.3IONS-SCNC: 11 MMOL/L (ref 3–16)
AST SERPL-CCNC: 38 U/L (ref 15–37)
BASOPHILS # BLD: 0 K/UL (ref 0–0.2)
BASOPHILS NFR BLD: 0.6 %
BILIRUB SERPL-MCNC: 0.3 MG/DL (ref 0–1)
BUN SERPL-MCNC: 18 MG/DL (ref 7–20)
CALCIUM SERPL-MCNC: 9.7 MG/DL (ref 8.3–10.6)
CHLORIDE SERPL-SCNC: 103 MMOL/L (ref 99–110)
CHOLEST SERPL-MCNC: 301 MG/DL (ref 0–199)
CO2 SERPL-SCNC: 26 MMOL/L (ref 21–32)
CREAT SERPL-MCNC: 1.1 MG/DL (ref 0.9–1.3)
DEPRECATED RDW RBC AUTO: 12.8 % (ref 12.4–15.4)
EOSINOPHIL # BLD: 0.1 K/UL (ref 0–0.6)
EOSINOPHIL NFR BLD: 3.2 %
EST. AVERAGE GLUCOSE BLD GHB EST-MCNC: 108.3 MG/DL
GFR SERPLBLD CREATININE-BSD FMLA CKD-EPI: 80 ML/MIN/{1.73_M2}
GLUCOSE SERPL-MCNC: 106 MG/DL (ref 70–99)
HBA1C MFR BLD: 5.4 %
HCT VFR BLD AUTO: 45.8 % (ref 40.5–52.5)
HDLC SERPL-MCNC: 49 MG/DL (ref 40–60)
HGB BLD-MCNC: 15.6 G/DL (ref 13.5–17.5)
LDLC SERPL CALC-MCNC: 225 MG/DL
LYMPHOCYTES # BLD: 1 K/UL (ref 1–5.1)
LYMPHOCYTES NFR BLD: 27.2 %
MCH RBC QN AUTO: 32.3 PG (ref 26–34)
MCHC RBC AUTO-ENTMCNC: 34 G/DL (ref 31–36)
MCV RBC AUTO: 94.9 FL (ref 80–100)
MONOCYTES # BLD: 0.4 K/UL (ref 0–1.3)
MONOCYTES NFR BLD: 11.8 %
NEUTROPHILS # BLD: 2 K/UL (ref 1.7–7.7)
NEUTROPHILS NFR BLD: 57.2 %
PLATELET # BLD AUTO: 219 K/UL (ref 135–450)
PMV BLD AUTO: 8.5 FL (ref 5–10.5)
POTASSIUM SERPL-SCNC: 5 MMOL/L (ref 3.5–5.1)
PROT SERPL-MCNC: 7 G/DL (ref 6.4–8.2)
PSA SERPL DL<=0.01 NG/ML-MCNC: 2.66 NG/ML (ref 0–4)
RBC # BLD AUTO: 4.82 M/UL (ref 4.2–5.9)
SODIUM SERPL-SCNC: 140 MMOL/L (ref 136–145)
TRIGL SERPL-MCNC: 137 MG/DL (ref 0–150)
TSH SERPL DL<=0.005 MIU/L-ACNC: 1.42 UIU/ML (ref 0.27–4.2)
VLDLC SERPL CALC-MCNC: 27 MG/DL
WBC # BLD AUTO: 3.5 K/UL (ref 4–11)

## 2025-03-30 ENCOUNTER — E-VISIT (OUTPATIENT)
Dept: FAMILY MEDICINE CLINIC | Age: 55
End: 2025-03-30
Payer: COMMERCIAL

## 2025-03-30 DIAGNOSIS — B96.89 ACUTE BACTERIAL SINUSITIS: Primary | ICD-10-CM

## 2025-03-30 DIAGNOSIS — J01.90 ACUTE BACTERIAL SINUSITIS: Primary | ICD-10-CM

## 2025-03-30 PROCEDURE — 99421 OL DIG E/M SVC 5-10 MIN: CPT | Performed by: FAMILY MEDICINE

## 2025-03-30 ASSESSMENT — LIFESTYLE VARIABLES: SMOKING_STATUS: NO, I'VE NEVER SMOKED

## 2025-03-31 NOTE — PROGRESS NOTES
Will send in antibiotics for acute sinusitis  Patient notified of results through [employer]  5-10 minutes were spent on the digital evaluation and management of this patient.

## 2025-06-03 ENCOUNTER — OFFICE VISIT (OUTPATIENT)
Dept: FAMILY MEDICINE CLINIC | Age: 55
End: 2025-06-03
Payer: COMMERCIAL

## 2025-06-03 ENCOUNTER — HOSPITAL ENCOUNTER (OUTPATIENT)
Dept: GENERAL RADIOLOGY | Age: 55
Discharge: HOME | End: 2025-06-03
Payer: COMMERCIAL

## 2025-06-03 VITALS
SYSTOLIC BLOOD PRESSURE: 132 MMHG | DIASTOLIC BLOOD PRESSURE: 80 MMHG | WEIGHT: 168 LBS | TEMPERATURE: 97.5 F | OXYGEN SATURATION: 99 % | HEART RATE: 66 BPM | BODY MASS INDEX: 24.45 KG/M2

## 2025-06-03 DIAGNOSIS — H93.13 TINNITUS OF BOTH EARS: ICD-10-CM

## 2025-06-03 DIAGNOSIS — B96.89 ACUTE BACTERIAL SINUSITIS: ICD-10-CM

## 2025-06-03 DIAGNOSIS — J32.9 RECURRENT SINUS INFECTIONS: Primary | ICD-10-CM

## 2025-06-03 DIAGNOSIS — J01.90 ACUTE BACTERIAL SINUSITIS: ICD-10-CM

## 2025-06-03 DIAGNOSIS — M50.30 DDD (DEGENERATIVE DISC DISEASE), CERVICAL: ICD-10-CM

## 2025-06-03 DIAGNOSIS — M54.12 CERVICAL RADICULOPATHY: ICD-10-CM

## 2025-06-03 DIAGNOSIS — J34.89 SINUS PRESSURE: ICD-10-CM

## 2025-06-03 DIAGNOSIS — I10 ESSENTIAL HYPERTENSION: ICD-10-CM

## 2025-06-03 DIAGNOSIS — Z23 NEED FOR DIPHTHERIA-TETANUS-PERTUSSIS (TDAP) VACCINE: ICD-10-CM

## 2025-06-03 DIAGNOSIS — S16.1XXA STRAIN OF NECK MUSCLE, INITIAL ENCOUNTER: ICD-10-CM

## 2025-06-03 PROCEDURE — 72040 X-RAY EXAM NECK SPINE 2-3 VW: CPT

## 2025-06-03 PROCEDURE — 3079F DIAST BP 80-89 MM HG: CPT | Performed by: FAMILY MEDICINE

## 2025-06-03 PROCEDURE — 90471 IMMUNIZATION ADMIN: CPT | Performed by: FAMILY MEDICINE

## 2025-06-03 PROCEDURE — 99214 OFFICE O/P EST MOD 30 MIN: CPT | Performed by: FAMILY MEDICINE

## 2025-06-03 PROCEDURE — 90715 TDAP VACCINE 7 YRS/> IM: CPT | Performed by: FAMILY MEDICINE

## 2025-06-03 PROCEDURE — 3075F SYST BP GE 130 - 139MM HG: CPT | Performed by: FAMILY MEDICINE

## 2025-06-03 RX ORDER — DOXYCYCLINE HYCLATE 100 MG
100 TABLET ORAL 2 TIMES DAILY
Qty: 20 TABLET | Refills: 0 | Status: SHIPPED | OUTPATIENT
Start: 2025-06-03 | End: 2025-06-13

## 2025-06-03 SDOH — ECONOMIC STABILITY: FOOD INSECURITY: WITHIN THE PAST 12 MONTHS, THE FOOD YOU BOUGHT JUST DIDN'T LAST AND YOU DIDN'T HAVE MONEY TO GET MORE.: NEVER TRUE

## 2025-06-03 SDOH — ECONOMIC STABILITY: FOOD INSECURITY: WITHIN THE PAST 12 MONTHS, YOU WORRIED THAT YOUR FOOD WOULD RUN OUT BEFORE YOU GOT MONEY TO BUY MORE.: NEVER TRUE

## 2025-06-03 ASSESSMENT — PATIENT HEALTH QUESTIONNAIRE - PHQ9
1. LITTLE INTEREST OR PLEASURE IN DOING THINGS: NOT AT ALL
SUM OF ALL RESPONSES TO PHQ QUESTIONS 1-9: 1
2. FEELING DOWN, DEPRESSED OR HOPELESS: SEVERAL DAYS
SUM OF ALL RESPONSES TO PHQ QUESTIONS 1-9: 1

## 2025-06-03 NOTE — PROGRESS NOTES
Here for eval of some URI sx/allergy sx.  Pt has had some issues of recurrent acute bacterial sinusitis issues, with most recent episode of sx associated with some upper neck pain, that seemed to come from the infection.  Pt starts with mild sx but then progresses to more of a R upper neck/shoulder issue.  Pt does have chronic/recurrent of sinus pressure and R sided pressure.  Pt will treat with antibiotics prn and with abx the sx do improve pretty quickly.  Pts last infection was 3/30 and did improve with abx    In the past few days, has a recurrence of sx with some R upper neck / trap pain with pain in shouder/neck and back of head.  Has missed work a few days d/t sx.  Does feel some sinus pressure.     Pt here for follow up of blood pressure.  Pt states doing great with adherence to therapy and feels well.  No issues of chest pain, shortness of breath.  No vision changes, headache, swelling in legs.       Except as noted above in the history of present illness, the review of systems is  negative for headache, vision changes, chest pain, shortness of breath, abdominal pain, urinary sx, bowel changes.    Past medical, surgical, and social history reviewed and updated  Medications and allergies reviewed and updated      O: /80 (BP Site: Left Upper Arm, Patient Position: Sitting, BP Cuff Size: Medium Adult)   Pulse 66   Temp 97.5 °F (36.4 °C) (Temporal)   Wt 76.2 kg (168 lb)   SpO2 99%   BMI 24.45 kg/m²   GEN: No acute distress, cooperative, well nourished, alert.   HEENT: PEERLA, EOMI , normocephalic/atraumatic, nares and oropharynx clear.  Mucous membranes normal, Tympanic membranes clear bilaterally mild to moderate nasal congestion R > L, no sinus tender to palpation .  Neck: soft, supple, no thyromegaly, mass, no Lymphadenopathy  CV: Regular rate and rhythm, no murmur, rubs, gallops. No edema.  Resp: Clear to auscultation bilaterally good air entry bilaterally  No crackles, wheeze. Breathing

## 2025-06-04 ENCOUNTER — RESULTS FOLLOW-UP (OUTPATIENT)
Dept: FAMILY MEDICINE CLINIC | Age: 55
End: 2025-06-04

## 2025-06-04 DIAGNOSIS — M50.30 DDD (DEGENERATIVE DISC DISEASE), CERVICAL: Primary | ICD-10-CM
